# Patient Record
Sex: FEMALE | Race: BLACK OR AFRICAN AMERICAN | NOT HISPANIC OR LATINO
[De-identification: names, ages, dates, MRNs, and addresses within clinical notes are randomized per-mention and may not be internally consistent; named-entity substitution may affect disease eponyms.]

---

## 2017-11-30 ENCOUNTER — RESULT REVIEW (OUTPATIENT)
Age: 47
End: 2017-11-30

## 2019-11-14 ENCOUNTER — RESULT REVIEW (OUTPATIENT)
Age: 49
End: 2019-11-14

## 2020-05-05 ENCOUNTER — APPOINTMENT (OUTPATIENT)
Dept: ORTHOPEDIC SURGERY | Facility: CLINIC | Age: 50
End: 2020-05-05
Payer: COMMERCIAL

## 2020-05-05 VITALS
SYSTOLIC BLOOD PRESSURE: 116 MMHG | BODY MASS INDEX: 42.75 KG/M2 | DIASTOLIC BLOOD PRESSURE: 71 MMHG | WEIGHT: 266 LBS | HEART RATE: 73 BPM | HEIGHT: 66 IN

## 2020-05-05 DIAGNOSIS — Z85.3 PERSONAL HISTORY OF MALIGNANT NEOPLASM OF BREAST: ICD-10-CM

## 2020-05-05 DIAGNOSIS — C79.51 SECONDARY MALIGNANT NEOPLASM OF BONE: ICD-10-CM

## 2020-05-05 DIAGNOSIS — C79.51 MALIGNANT NEOPLASM OF UNSPECIFIED SITE OF LEFT FEMALE BREAST: ICD-10-CM

## 2020-05-05 DIAGNOSIS — Z78.9 OTHER SPECIFIED HEALTH STATUS: ICD-10-CM

## 2020-05-05 DIAGNOSIS — Z86.69 PERSONAL HISTORY OF OTHER DISEASES OF THE NERVOUS SYSTEM AND SENSE ORGANS: ICD-10-CM

## 2020-05-05 DIAGNOSIS — C50.912 MALIGNANT NEOPLASM OF UNSPECIFIED SITE OF LEFT FEMALE BREAST: ICD-10-CM

## 2020-05-05 PROCEDURE — 72190 X-RAY EXAM OF PELVIS: CPT

## 2020-05-05 PROCEDURE — 99203 OFFICE O/P NEW LOW 30 MIN: CPT

## 2020-05-05 RX ORDER — FUROSEMIDE 80 MG/1
TABLET ORAL
Refills: 0 | Status: ACTIVE | COMMUNITY

## 2020-05-05 RX ORDER — DENOSUMAB 120 MG/1.7ML
120 INJECTION SUBCUTANEOUS
Refills: 0 | Status: ACTIVE | COMMUNITY

## 2020-05-05 NOTE — DISCUSSION/SUMMARY
[de-identified] : Patient has lesions that are hot on PET scan but there is no obvious impending pathologic fracture.  The cortical bone itself looks okay all around and she is having no pain with weightbearing and walking.  My recommendations are to continue with her chemotherapy and to continue following this.  Should these become symptomatic I can certainly see her sooner but otherwise I can follow her up with exam after the next PET/CT or as needed.  She currently does not need any orthopedic intervention.  She is already on bone sparing medications.\par \par \par If imaging was ordered, the patient was told to make an appointment to review findings right after all imaging is completed.\par \par We discussed risks, benefits and alternatives. Rationale of care was reviewed and all questions were answered. Patient (and family) had all questions answered to her degree of the level of satisfaction. Patient (and family) expressed understanding and interest in proceeding with the plan as outlined.\par \par \par \par \par This note was done with a voice recognition transcription software and any typos are related to this rather than medical error. Surgical risks reviewed. Patient (and family) had all questions answered to an agreeable level of satisfaction. Patient (and family) expressed understanding and interest in proceeding with the plan as outlined.  \par

## 2020-05-05 NOTE — HISTORY OF PRESENT ILLNESS
[FreeTextEntry1] : This is a 49-year-old female who was diagnosed with ER positive WA negative HER-2 positive breast cancer in 2010 having had lumpectomy and lymph node dissection.  She had been doing well for a few years on and off with chemotherapy and other findings until recently on a PET scan she was found to have significant bony disease.  She had known about a spine pathologic fracture in the past which never had any other treatment.  She currently had been having some bilateral left worse than right hip pain which was handled with ibuprofen or acetaminophen but was always able to walk.  She started chemotherapy as month and a half ago and her pain has completely resolved.  She is walking without problems.  She uses a cane because of a previous ankle fracture around 2014.  She has been on denosumab since 2017. [Bending] : not exacerbated by bending [Direct Pressure] : not exacerbated by direct pressure

## 2020-05-05 NOTE — PHYSICAL EXAM
[FreeTextEntry1] : On exam patient is walking almost normally.  She has good flexion up to 90 degrees for self.  She has no pain with abduction internal and external rotation as well as with lateral and AP compression of the pelvis.  She has no pain over the sacrum or over the ischium bilaterally.  Her leg lengths are equal.  She is neurovascularly intact with no paresthesias.  She has no lymphadenopathy.  She has no upper extremity pain. [Swelling] : no swelling [Skin Changes - Describe changes:] : No skin changes noted [Tenderness] : no tenderness

## 2020-05-05 NOTE — CONSULT LETTER
[FreeTextEntry2] : Yani Crum MD\par [id number]\par [place] [id number] [FreeTextEntry1] : \par After evaluating your patient and reviewing the studies presented we have come to a mutually agreeable plan. \par \par Please see my note below.\par \par Should you have further questions, please feel free to call and discuss the care of your patient.\par \par Thank you for the confidence of your referral.\par \par Sincerely, \par \par Joseph Garza MD \par Musculoskeletal Oncology \par [address] [facility], [address], \[place] [address] \par [phone]

## 2020-05-05 NOTE — DATA REVIEWED
[de-identified] : X-rays today AP and Judet views of the pelvis and bilateral hips show no obvious destructive lesions.  There may be some faint sclerosis in the left femoral neck.  There are no areas of lucencies of concern from the PET/CT.\par \par PET/CT from February 28, 2020 skull base to thigh shows multiple metastatic osseous lesions including the right side of the sacrum L1 left femoral neck lesion with no pathologic fracture compression fracture involving T9 which is old as well as in the right ischium.

## 2020-05-05 NOTE — REASON FOR VISIT
[FreeTextEntry1] : Findings on PET/CT of bony metastasis with a history of breast cancer, sent in consultation by Dr. Crum

## 2020-06-01 ENCOUNTER — APPOINTMENT (OUTPATIENT)
Dept: DISASTER EMERGENCY | Facility: CLINIC | Age: 50
End: 2020-06-01

## 2020-06-01 DIAGNOSIS — Z01.818 ENCOUNTER FOR OTHER PREPROCEDURAL EXAMINATION: ICD-10-CM

## 2020-06-02 LAB — SARS-COV-2 N GENE NPH QL NAA+PROBE: NOT DETECTED

## 2025-01-16 ENCOUNTER — EMERGENCY (EMERGENCY)
Facility: HOSPITAL | Age: 55
LOS: 0 days | Discharge: ANOTHER HOSPITAL | End: 2025-01-17
Attending: STUDENT IN AN ORGANIZED HEALTH CARE EDUCATION/TRAINING PROGRAM
Payer: COMMERCIAL

## 2025-01-16 VITALS
TEMPERATURE: 98 F | HEIGHT: 66 IN | SYSTOLIC BLOOD PRESSURE: 125 MMHG | DIASTOLIC BLOOD PRESSURE: 80 MMHG | RESPIRATION RATE: 18 BRPM | OXYGEN SATURATION: 98 % | HEART RATE: 64 BPM | WEIGHT: 266.1 LBS

## 2025-01-16 LAB
ANION GAP SERPL CALC-SCNC: 7 MMOL/L — SIGNIFICANT CHANGE UP (ref 5–17)
BUN SERPL-MCNC: 11 MG/DL — SIGNIFICANT CHANGE UP (ref 7–23)
CALCIUM SERPL-MCNC: 9.5 MG/DL — SIGNIFICANT CHANGE UP (ref 8.5–10.1)
CHLORIDE SERPL-SCNC: 107 MMOL/L — SIGNIFICANT CHANGE UP (ref 96–108)
CO2 SERPL-SCNC: 27 MMOL/L — SIGNIFICANT CHANGE UP (ref 22–31)
CREAT SERPL-MCNC: 0.89 MG/DL — SIGNIFICANT CHANGE UP (ref 0.5–1.3)
EGFR: 78 ML/MIN/1.73M2 — SIGNIFICANT CHANGE UP
GLUCOSE SERPL-MCNC: 83 MG/DL — SIGNIFICANT CHANGE UP (ref 70–99)
HCG SERPL-ACNC: <1 MIU/ML — SIGNIFICANT CHANGE UP
HCT VFR BLD CALC: 28 % — LOW (ref 34.5–45)
HGB BLD-MCNC: 9.1 G/DL — LOW (ref 11.5–15.5)
MCHC RBC-ENTMCNC: 32.5 G/DL — SIGNIFICANT CHANGE UP (ref 32–36)
MCHC RBC-ENTMCNC: 32.6 PG — SIGNIFICANT CHANGE UP (ref 27–34)
MCV RBC AUTO: 100.4 FL — HIGH (ref 80–100)
NRBC # BLD: 3 /100 WBCS — HIGH (ref 0–0)
PLATELET # BLD AUTO: 252 K/UL — SIGNIFICANT CHANGE UP (ref 150–400)
POTASSIUM SERPL-MCNC: 4.1 MMOL/L — SIGNIFICANT CHANGE UP (ref 3.5–5.3)
POTASSIUM SERPL-SCNC: 4.1 MMOL/L — SIGNIFICANT CHANGE UP (ref 3.5–5.3)
RBC # BLD: 2.79 M/UL — LOW (ref 3.8–5.2)
RBC # FLD: 22.9 % — HIGH (ref 10.3–14.5)
SODIUM SERPL-SCNC: 141 MMOL/L — SIGNIFICANT CHANGE UP (ref 135–145)
WBC # BLD: 4.66 K/UL — SIGNIFICANT CHANGE UP (ref 3.8–10.5)
WBC # FLD AUTO: 4.66 K/UL — SIGNIFICANT CHANGE UP (ref 3.8–10.5)

## 2025-01-16 PROCEDURE — 99285 EMERGENCY DEPT VISIT HI MDM: CPT

## 2025-01-16 NOTE — ED PROVIDER NOTE - ATTENDING CONTRIBUTION TO CARE
This patient was evaluated with linda Landa.  The patient's HPI, ROS, and physical exam above were noted and agreed to unless otherwise commented on below.  Nursing notes and vital signs were reviewed.     HPI: This patient is a 54-year-old female presenting to the emergency department today for back pain.  She is a past medical history of metastatic breast cancer.  She has had back pain for roughly 3 days.  This patient states that she has had back pain for roughly 3 days.  She was seen 2 days ago and had a CT scan performed that was read as negative.  She is complaining of some numbness in her legs.  States that she has some urinary retention as well as fecal retention.  No nausea or vomiting.  No headaches, lightheadedness, or dizziness.  No chest pain or shortness of breath.  She denies fevers.  States that she has no redness over the back.  She has no other complaints at this time.    Pertinent Physical Exam:   GENERAL: The patient appears well and is in no apparent distress.    EYES: Pupils equal and reactive.  Extraocular eye movements are intact.    ENT: Head is atraumatic.  Posterior oropharynx is unremarkable.      RESPIRATORY: Lungs are clear to auscultation bilaterally.  The patient has no significant wheezing, rhonchi, or rales.    CARDIOVASCULAR: The patient has a regular rate and rhythm with no significant murmurs, rubs, or gallops.    ABDOMEN: Abdomen is soft, nondistended, and non-peritoneal.  The patient has no focal areas of tenderness.    SKIN: Skin is intact without evidence of significant lacerations or sores.    MUSCULOSKELETAL: Patient has good range of motion of all extremities.  The patient has good distal cap refill.  The patient has palpable distal pulses.  No obvious edema is noted.  Tender to palpation over the lumbar region of the spine.    NEUROLOGIC: Cranial nerves II through XII are grossly within normal limits.  Sensory and motor examination is unremarkable.  Positive for saddle anesthesia    PSYCHIATRIC: Patient is awake, alert, and oriented ×4.    MDM: This patient was evaluated with linda Landa.  The patient's HPI, ROS, and physical exam above were noted and agreed to unless otherwise commented on below.  Nursing notes and vital signs were reviewed.     HPI: This patient is a 54-year-old female presenting to the emergency department today for back pain.  She is a past medical history of metastatic breast cancer.  She has had back pain for roughly 3 days.  This patient states that she has had back pain for roughly 3 days.  She was seen 2 days ago and had a CT scan performed that was read as negative.  She is complaining of some numbness in her legs.  States that she has some urinary retention as well as fecal retention.  No nausea or vomiting.  No headaches, lightheadedness, or dizziness.  No chest pain or shortness of breath.  She denies fevers.  States that she has no redness over the back.  She has no other complaints at this time.    Pertinent Physical Exam:   GENERAL: The patient appears well and is in no apparent distress.    EYES: Pupils equal and reactive.  Extraocular eye movements are intact.    ENT: Head is atraumatic.  Posterior oropharynx is unremarkable.      RESPIRATORY: Lungs are clear to auscultation bilaterally.  The patient has no significant wheezing, rhonchi, or rales.    CARDIOVASCULAR: The patient has a regular rate and rhythm with no significant murmurs, rubs, or gallops.    ABDOMEN: Abdomen is soft, nondistended, and non-peritoneal.  The patient has no focal areas of tenderness.    SKIN: Skin is intact without evidence of significant lacerations or sores.    MUSCULOSKELETAL: Patient has good range of motion of all extremities.  The patient has good distal cap refill.  The patient has palpable distal pulses.  No obvious edema is noted.  Tender to palpation over the lumbar region of the spine.    NEUROLOGIC: Cranial nerves II through XII are grossly within normal limits.  Sensory and motor examination is unremarkable.  Positive for saddle anesthesia    PSYCHIATRIC: Patient is awake, alert, and oriented ×4.    MDM:  This patient is a 54-year-old female presenting to the emergency department today for back pain.  CBC shows no leukocytosis.  No acute anemia.  No thrombocytopenia.  BMP shows no significant electrode abnormalities.  Patient's pregnancy is negative.    Patient is complaining of saddle anesthesia.  She has urinary retention.  She has gait ataxia as well.  History of metastatic breast cancer.  Concern today for possible cauda equina syndrome.  An MRI of the lumbar spine will be ordered for this patient.    I discussed this case with the MRI technician who states that he will arrive to perform MRI for this patient.  This patient's MRI will not be completed until after the end of my shift.  For this reason, the patient will be signed out to the oncoming physician.  Patient's urinalysis is also not been completed at this time.  Please see the oncoming physician's addendum's, notes, and progress notes for any change in this patient's management or care. This patient was evaluated with linda Landa.  The patient's HPI, ROS, and physical exam above were noted and agreed to unless otherwise commented on below.  Nursing notes and vital signs were reviewed.     HPI: This patient is a 54-year-old female presenting to the emergency department today for back pain.  She is a past medical history of metastatic breast cancer.  She has had back pain for roughly 3 days.  This patient states that she has had back pain for roughly 3 days.  She was seen 2 days ago and had a CT scan performed that was read as negative.  She is complaining of some numbness in her legs.  States that she has some urinary retention as well as fecal retention.  No nausea or vomiting.  No headaches, lightheadedness, or dizziness.  No chest pain or shortness of breath.  She denies fevers.  States that she has no redness over the back.  She has no other complaints at this time.    Pertinent Physical Exam:   GENERAL: The patient appears well and is in no apparent distress.    EYES: Pupils equal and reactive.  Extraocular eye movements are intact.    ENT: Head is atraumatic.  Posterior oropharynx is unremarkable.      RESPIRATORY: Lungs are clear to auscultation bilaterally.  The patient has no significant wheezing, rhonchi, or rales.    CARDIOVASCULAR: The patient has a regular rate and rhythm with no significant murmurs, rubs, or gallops.    ABDOMEN: Abdomen is soft, nondistended, and non-peritoneal.  The patient has no focal areas of tenderness.    SKIN: Skin is intact without evidence of significant lacerations or sores.    MUSCULOSKELETAL: Patient has good range of motion of all extremities.  The patient has good distal cap refill.  The patient has palpable distal pulses.  No obvious edema is noted.  Tender to palpation over the lumbar region of the spine.    NEUROLOGIC: Cranial nerves II through XII are grossly within normal limits.  Sensory and motor examination is unremarkable.  Positive for saddle anesthesia    PSYCHIATRIC: Patient is awake, alert, and oriented ×4.    MDM:  This patient is a 54-year-old female presenting to the emergency department today for back pain.  CBC shows no leukocytosis.  No acute anemia.  No thrombocytopenia.  BMP shows no significant electrode abnormalities.  Patient's pregnancy is negative.    Patient is complaining of saddle anesthesia.  She has urinary retention.  She has gait ataxia as well.  History of metastatic breast cancer.  Concern today for possible cauda equina syndrome.  An MRI of the lumbar spine will be ordered for this patient.    I discussed this case with the MRI technician who states that he will arrive to perform MRI for this patient.  This patient's MRI will not be completed until after the end of my shift.  For this reason, the patient will be signed out to the oncoming physician.  Patient's urinalysis is also not been completed at this time.  Please see the oncoming physician's addendum's, notes, and progress notes for any change in this patient's management or care.    30 minutes of critical care time were spent on patient evaluation, medical decision making, interpretation of results, discussion with consultants and documentation.

## 2025-01-16 NOTE — ED ADULT NURSE NOTE - OBJECTIVE STATEMENT
Patient alert and oriented x4. Patient is a 54 year old female w/ PMH breast cancer c/o back pain. States lower back pain began on Monday and that she began feeling numbness and tingling down b/l legs so she saw her doctor and she got an outpatient CT scan. CT scan was normal so she came into ER as problem has not resolved. Also c/o bilateral lower extremity swelling as well as difficulty urinating. States she is unable to ambulate. Currently undergoing chemotherapy, last session on 1/2.

## 2025-01-16 NOTE — ED PROVIDER NOTE - NSICDXPASTMEDICALHX_GEN_ALL_CORE_FT
PAST MEDICAL HISTORY:  Ankle fracture, right     Breast CA, left     No pertinent past medical history

## 2025-01-16 NOTE — ED ADULT TRIAGE NOTE - CHIEF COMPLAINT QUOTE
c/o lower back pain since monday with b/l lower extremities swollen and constipation difficulty urinating states numbness to b/l lower extremities states l lower back pain and under treatment with chemo for metastatic breast ca mets to bone had ct abd/pelvis on tues no abnormal findings l lumpectomy with lymph nodes removed 2010 at baseline ambulatory with cane prn , extremely painful to ambulate/bear weight c/o abdominal bloating and mild nausea, back spasms denies recent injury or known trauma

## 2025-01-16 NOTE — ED ADULT NURSE NOTE - NSFALLRISKINTERV_ED_ALL_ED

## 2025-01-16 NOTE — ED PROVIDER NOTE - PROGRESS NOTE DETAILS
I called the [facility] and spoke to Dr. Judd of neurosurgery and discussed the case with her and she and her attending Dr. Green accepted the transfer to the [facility] ED, tier 1 and agree with dexamethasone in the ED. - Boby ROB

## 2025-01-16 NOTE — ED PROVIDER NOTE - OBJECTIVE STATEMENT
54y F w/ PMHx metastatic breast CA and heart disease presenting with back pain x 3 days. Pt states Monday night she started to feel B/L lower back pain associated with B/L lower ext numbness causing her difficulty ambulating, urinary retention, and constipation. States she was waiting on CT results which came back negative today, causing her to come to the ED d/t confusion of her symptoms. Currently undergoing chemo every 3 weeks (last chemo Jan 2nd 2024). Pt denies fever, cough, chest pain, SOB, or recent falls.

## 2025-01-16 NOTE — ED PROVIDER NOTE - INTERNATIONAL TRAVEL
.  Visit Information    Have you changed or started any medications since your last visit including any over-the-counter medicines, vitamins, or herbal medicines? no   Are you having any side effects from any of your medications? -  no  Have you stopped taking any of your medications? Is so, why? -  no    Have you seen any other physician or provider since your last visit? No  Have you had any other diagnostic tests since your last visit? No  Have you been seen in the emergency room and/or had an admission to a hospital since we last saw you? No  Have you had your routine dental cleaning in the past 6 months? yes -    Have you activated your [employer] account? If not, what are your barriers?  Yes     Patient Care Team:  Sapphire Thao MD as PCP - General (Family Medicine)  Sapphire Thao MD as PCP - [facility]  Jeb Quan MD (Dermatology)  Julisa Hanley MD as Surgeon (Cardiology)  Meño Quach DPM as Consulting Physician (Podiatry)  Wesley Munguia MD as Resident (Family Medicine)  Irene Primes    Medical History Review  Past Medical, Family, and Social History reviewed and does contribute to the patient presenting condition    Health Maintenance   Topic Date Due    Colon cancer screen colonoscopy  Never done    Flu vaccine (1) 09/01/2021    Lipid screen  03/17/2022    Potassium monitoring  03/17/2022    Creatinine monitoring  03/17/2022    DTaP/Tdap/Td vaccine (2 - Td or Tdap) 01/01/2024    Diabetes screen  03/12/2024    COVID-19 Vaccine  Completed    Hepatitis C screen  Completed    HIV screen  Completed    Hepatitis A vaccine  Aged Out    Hepatitis B vaccine  Aged Out    Hib vaccine  Aged Out    Meningococcal (ACWY) vaccine  Aged Out    Pneumococcal 0-64 years Vaccine  Aged Out No

## 2025-01-16 NOTE — ED PROVIDER NOTE - CARE PLAN
1 Principal Discharge DX:	Back pain   Principal Discharge DX:	Compression of thoracic or lumbar spinal cord

## 2025-01-17 ENCOUNTER — INPATIENT (INPATIENT)
Facility: HOSPITAL | Age: 55
LOS: 5 days | Discharge: HOME HEALTH | DRG: 29 | End: 2025-01-23
Attending: NEUROLOGICAL SURGERY | Admitting: NEUROLOGICAL SURGERY
Payer: COMMERCIAL

## 2025-01-17 ENCOUNTER — RESULT REVIEW (OUTPATIENT)
Age: 55
End: 2025-01-17

## 2025-01-17 VITALS
DIASTOLIC BLOOD PRESSURE: 87 MMHG | OXYGEN SATURATION: 99 % | SYSTOLIC BLOOD PRESSURE: 124 MMHG | TEMPERATURE: 98 F | HEART RATE: 80 BPM | RESPIRATION RATE: 18 BRPM

## 2025-01-17 VITALS
OXYGEN SATURATION: 99 % | DIASTOLIC BLOOD PRESSURE: 60 MMHG | HEART RATE: 64 BPM | TEMPERATURE: 98 F | SYSTOLIC BLOOD PRESSURE: 142 MMHG | RESPIRATION RATE: 12 BRPM

## 2025-01-17 DIAGNOSIS — C79.81 SECONDARY MALIGNANT NEOPLASM OF BREAST: ICD-10-CM

## 2025-01-17 DIAGNOSIS — C80.1 MALIGNANT (PRIMARY) NEOPLASM, UNSPECIFIED: ICD-10-CM

## 2025-01-17 DIAGNOSIS — R20.0 ANESTHESIA OF SKIN: ICD-10-CM

## 2025-01-17 DIAGNOSIS — M54.50 LOW BACK PAIN, UNSPECIFIED: ICD-10-CM

## 2025-01-17 DIAGNOSIS — G95.20 UNSPECIFIED CORD COMPRESSION: ICD-10-CM

## 2025-01-17 DIAGNOSIS — R33.9 RETENTION OF URINE, UNSPECIFIED: ICD-10-CM

## 2025-01-17 LAB
ALBUMIN SERPL ELPH-MCNC: 4 G/DL — SIGNIFICANT CHANGE UP (ref 3.3–5)
ALP SERPL-CCNC: 69 U/L — SIGNIFICANT CHANGE UP (ref 40–120)
ALT FLD-CCNC: 10 U/L — SIGNIFICANT CHANGE UP (ref 10–45)
ANION GAP SERPL CALC-SCNC: 15 MMOL/L — SIGNIFICANT CHANGE UP (ref 5–17)
ANISOCYTOSIS BLD QL: SIGNIFICANT CHANGE UP
APTT BLD: 36.1 SEC — HIGH (ref 24.5–35.6)
AST SERPL-CCNC: 17 U/L — SIGNIFICANT CHANGE UP (ref 10–40)
BASOPHILS # BLD AUTO: 0 K/UL — SIGNIFICANT CHANGE UP (ref 0–0.2)
BASOPHILS NFR BLD AUTO: 0 % — SIGNIFICANT CHANGE UP (ref 0–2)
BILIRUB SERPL-MCNC: 0.7 MG/DL — SIGNIFICANT CHANGE UP (ref 0.2–1.2)
BLD GP AB SCN SERPL QL: NEGATIVE — SIGNIFICANT CHANGE UP
BUN SERPL-MCNC: 11 MG/DL — SIGNIFICANT CHANGE UP (ref 7–23)
CALCIUM SERPL-MCNC: 9.6 MG/DL — SIGNIFICANT CHANGE UP (ref 8.4–10.5)
CHLORIDE SERPL-SCNC: 103 MMOL/L — SIGNIFICANT CHANGE UP (ref 96–108)
CO2 SERPL-SCNC: 22 MMOL/L — SIGNIFICANT CHANGE UP (ref 22–31)
CREAT SERPL-MCNC: 0.93 MG/DL — SIGNIFICANT CHANGE UP (ref 0.5–1.3)
EGFR: 74 ML/MIN/1.73M2 — SIGNIFICANT CHANGE UP
EOSINOPHIL # BLD AUTO: 0.05 K/UL — SIGNIFICANT CHANGE UP (ref 0–0.5)
EOSINOPHIL NFR BLD AUTO: 0.9 % — SIGNIFICANT CHANGE UP (ref 0–6)
GAS PNL BLDV: SIGNIFICANT CHANGE UP
GLUCOSE SERPL-MCNC: 107 MG/DL — HIGH (ref 70–99)
HCG SERPL-ACNC: <2 MIU/ML — SIGNIFICANT CHANGE UP
HCG SERPL-ACNC: <2 MIU/ML — SIGNIFICANT CHANGE UP
HCT VFR BLD CALC: 27.8 % — LOW (ref 34.5–45)
HGB BLD-MCNC: 8.7 G/DL — LOW (ref 11.5–15.5)
INR BLD: 1.09 RATIO — SIGNIFICANT CHANGE UP (ref 0.85–1.16)
LYMPHOCYTES # BLD AUTO: 0.71 K/UL — LOW (ref 1–3.3)
LYMPHOCYTES # BLD AUTO: 13 % — SIGNIFICANT CHANGE UP (ref 13–44)
MANUAL SMEAR VERIFICATION: SIGNIFICANT CHANGE UP
MCHC RBC-ENTMCNC: 31.3 G/DL — LOW (ref 32–36)
MCHC RBC-ENTMCNC: 31.6 PG — SIGNIFICANT CHANGE UP (ref 27–34)
MCV RBC AUTO: 101.1 FL — HIGH (ref 80–100)
METAMYELOCYTES # FLD: 1.7 % — HIGH (ref 0–0)
METAMYELOCYTES NFR BLD: 1.7 % — HIGH (ref 0–0)
MONOCYTES # BLD AUTO: 0.38 K/UL — SIGNIFICANT CHANGE UP (ref 0–0.9)
MONOCYTES NFR BLD AUTO: 7 % — SIGNIFICANT CHANGE UP (ref 2–14)
MYELOCYTES NFR BLD: 0.9 % — HIGH (ref 0–0)
NEUTROPHILS # BLD AUTO: 4.15 K/UL — SIGNIFICANT CHANGE UP (ref 1.8–7.4)
NEUTROPHILS NFR BLD AUTO: 74.8 % — SIGNIFICANT CHANGE UP (ref 43–77)
NEUTS BAND # BLD: 1.7 % — SIGNIFICANT CHANGE UP (ref 0–8)
NEUTS BAND NFR BLD: 1.7 % — SIGNIFICANT CHANGE UP (ref 0–8)
PLAT MORPH BLD: NORMAL — SIGNIFICANT CHANGE UP
PLATELET # BLD AUTO: 260 K/UL — SIGNIFICANT CHANGE UP (ref 150–400)
POIKILOCYTOSIS BLD QL AUTO: SLIGHT — SIGNIFICANT CHANGE UP
POLYCHROMASIA BLD QL SMEAR: SLIGHT — SIGNIFICANT CHANGE UP
POTASSIUM SERPL-MCNC: 3.6 MMOL/L — SIGNIFICANT CHANGE UP (ref 3.5–5.3)
POTASSIUM SERPL-SCNC: 3.6 MMOL/L — SIGNIFICANT CHANGE UP (ref 3.5–5.3)
PROT SERPL-MCNC: 7.2 G/DL — SIGNIFICANT CHANGE UP (ref 6–8.3)
PROTHROM AB SERPL-ACNC: 12.4 SEC — SIGNIFICANT CHANGE UP (ref 9.9–13.4)
RBC # BLD: 2.75 M/UL — LOW (ref 3.8–5.2)
RBC # FLD: 23 % — HIGH (ref 10.3–14.5)
RBC BLD AUTO: ABNORMAL
RH IG SCN BLD-IMP: POSITIVE — SIGNIFICANT CHANGE UP
RH IG SCN BLD-IMP: POSITIVE — SIGNIFICANT CHANGE UP
SODIUM SERPL-SCNC: 140 MMOL/L — SIGNIFICANT CHANGE UP (ref 135–145)
WBC # BLD: 5.43 K/UL — SIGNIFICANT CHANGE UP (ref 3.8–10.5)
WBC # FLD AUTO: 5.43 K/UL — SIGNIFICANT CHANGE UP (ref 3.8–10.5)

## 2025-01-17 PROCEDURE — 72149 MRI LUMBAR SPINE W/DYE: CPT | Mod: 26

## 2025-01-17 PROCEDURE — 99291 CRITICAL CARE FIRST HOUR: CPT

## 2025-01-17 PROCEDURE — 74177 CT ABD & PELVIS W/CONTRAST: CPT | Mod: 26

## 2025-01-17 PROCEDURE — 93970 EXTREMITY STUDY: CPT | Mod: 26

## 2025-01-17 PROCEDURE — 93306 TTE W/DOPPLER COMPLETE: CPT | Mod: 26

## 2025-01-17 PROCEDURE — 71260 CT THORAX DX C+: CPT | Mod: 26

## 2025-01-17 PROCEDURE — 72147 MRI CHEST SPINE W/DYE: CPT | Mod: 26

## 2025-01-17 RX ORDER — ANTISEPTIC SURGICAL SCRUB 0.04 MG/ML
1 SOLUTION TOPICAL DAILY
Refills: 0 | Status: DISCONTINUED | OUTPATIENT
Start: 2025-01-17 | End: 2025-01-18

## 2025-01-17 RX ORDER — POTASSIUM CHLORIDE 750 MG/1
20 TABLET, EXTENDED RELEASE ORAL
Refills: 0 | Status: COMPLETED | OUTPATIENT
Start: 2025-01-17 | End: 2025-01-17

## 2025-01-17 RX ORDER — DEXAMETHASONE SODIUM PHOSPHATE 4 MG/ML
10 VIAL (ML) INJECTION ONCE
Refills: 0 | Status: COMPLETED | OUTPATIENT
Start: 2025-01-17 | End: 2025-01-17

## 2025-01-17 RX ORDER — BISACODYL 5 MG
5 TABLET, DELAYED RELEASE (ENTERIC COATED) ORAL DAILY
Refills: 0 | Status: DISCONTINUED | OUTPATIENT
Start: 2025-01-17 | End: 2025-01-18

## 2025-01-17 RX ORDER — POLYETHYLENE GLYCOL 3350 17 G/17G
17 POWDER, FOR SOLUTION ORAL DAILY
Refills: 0 | Status: DISCONTINUED | OUTPATIENT
Start: 2025-01-17 | End: 2025-01-18

## 2025-01-17 RX ORDER — OXYCODONE HYDROCHLORIDE 30 MG/1
5 TABLET ORAL EVERY 4 HOURS
Refills: 0 | Status: DISCONTINUED | OUTPATIENT
Start: 2025-01-17 | End: 2025-01-18

## 2025-01-17 RX ORDER — DEXAMETHASONE SODIUM PHOSPHATE 4 MG/ML
4 INJECTION, SOLUTION INTRA-ARTICULAR; INTRALESIONAL; INTRAMUSCULAR; INTRAVENOUS; SOFT TISSUE EVERY 6 HOURS
Refills: 0 | Status: DISCONTINUED | OUTPATIENT
Start: 2025-01-17 | End: 2025-01-18

## 2025-01-17 RX ORDER — BACTERIOSTATIC SODIUM CHLORIDE 0.9 %
500 VIAL (ML) INJECTION ONCE
Refills: 0 | Status: COMPLETED | OUTPATIENT
Start: 2025-01-17 | End: 2025-01-17

## 2025-01-17 RX ORDER — METOPROLOL SUCCINATE 25 MG
1 TABLET, EXTENDED RELEASE 24 HR ORAL
Refills: 0 | DISCHARGE

## 2025-01-17 RX ORDER — METOPROLOL SUCCINATE 25 MG
25 TABLET, EXTENDED RELEASE 24 HR ORAL DAILY
Refills: 0 | Status: DISCONTINUED | OUTPATIENT
Start: 2025-01-17 | End: 2025-01-17

## 2025-01-17 RX ORDER — METOPROLOL SUCCINATE 25 MG
12.5 TABLET, EXTENDED RELEASE 24 HR ORAL
Refills: 0 | Status: DISCONTINUED | OUTPATIENT
Start: 2025-01-17 | End: 2025-01-18

## 2025-01-17 RX ORDER — PANTOPRAZOLE 20 MG/1
40 TABLET, DELAYED RELEASE ORAL DAILY
Refills: 0 | Status: DISCONTINUED | OUTPATIENT
Start: 2025-01-17 | End: 2025-01-17

## 2025-01-17 RX ORDER — FAMOTIDINE 10 MG/ML
1 INJECTION INTRAVENOUS
Refills: 0 | DISCHARGE

## 2025-01-17 RX ORDER — PHENYLEPHRINE HYDROCHLORIDE 10 MG/ML
0.1 INJECTION INTRAVENOUS
Qty: 40 | Refills: 0 | Status: DISCONTINUED | OUTPATIENT
Start: 2025-01-17 | End: 2025-01-18

## 2025-01-17 RX ORDER — BACTERIOSTATIC SODIUM CHLORIDE 0.9 %
1000 VIAL (ML) INJECTION
Refills: 0 | Status: DISCONTINUED | OUTPATIENT
Start: 2025-01-17 | End: 2025-01-18

## 2025-01-17 RX ORDER — SENNOSIDES 8.6 MG
2 TABLET ORAL AT BEDTIME
Refills: 0 | Status: DISCONTINUED | OUTPATIENT
Start: 2025-01-17 | End: 2025-01-18

## 2025-01-17 RX ORDER — FAMOTIDINE 10 MG/ML
40 INJECTION INTRAVENOUS
Refills: 0 | Status: DISCONTINUED | OUTPATIENT
Start: 2025-01-17 | End: 2025-01-18

## 2025-01-17 RX ORDER — OXYCODONE HYDROCHLORIDE 30 MG/1
10 TABLET ORAL EVERY 4 HOURS
Refills: 0 | Status: DISCONTINUED | OUTPATIENT
Start: 2025-01-17 | End: 2025-01-18

## 2025-01-17 RX ORDER — BACTERIOSTATIC SODIUM CHLORIDE 0.9 %
1000 VIAL (ML) INJECTION ONCE
Refills: 0 | Status: COMPLETED | OUTPATIENT
Start: 2025-01-17 | End: 2025-01-17

## 2025-01-17 RX ORDER — OMEPRAZOLE 20 MG/1
1 CAPSULE, DELAYED RELEASE ORAL
Refills: 0 | DISCHARGE

## 2025-01-17 RX ORDER — ACETAMINOPHEN 160 MG/5ML
1000 SUSPENSION ORAL EVERY 6 HOURS
Refills: 0 | Status: DISCONTINUED | OUTPATIENT
Start: 2025-01-17 | End: 2025-01-18

## 2025-01-17 RX ADMIN — Medication 75 MILLILITER(S): at 05:45

## 2025-01-17 RX ADMIN — Medication 75 MILLILITER(S): at 03:25

## 2025-01-17 RX ADMIN — Medication 1000 MILLILITER(S): at 17:06

## 2025-01-17 RX ADMIN — PHENYLEPHRINE HYDROCHLORIDE 4.46 MICROGRAM(S)/KG/MIN: 10 INJECTION INTRAVENOUS at 20:13

## 2025-01-17 RX ADMIN — DEXAMETHASONE SODIUM PHOSPHATE 4 MILLIGRAM(S): 4 INJECTION, SOLUTION INTRA-ARTICULAR; INTRALESIONAL; INTRAMUSCULAR; INTRAVENOUS; SOFT TISSUE at 09:16

## 2025-01-17 RX ADMIN — Medication 5 MILLIGRAM(S): at 12:17

## 2025-01-17 RX ADMIN — DEXAMETHASONE SODIUM PHOSPHATE 4 MILLIGRAM(S): 4 INJECTION, SOLUTION INTRA-ARTICULAR; INTRALESIONAL; INTRAMUSCULAR; INTRAVENOUS; SOFT TISSUE at 17:06

## 2025-01-17 RX ADMIN — POTASSIUM CHLORIDE 20 MILLIEQUIVALENT(S): 750 TABLET, EXTENDED RELEASE ORAL at 09:16

## 2025-01-17 RX ADMIN — Medication 1000 MILLILITER(S): at 09:20

## 2025-01-17 RX ADMIN — Medication 100 MILLILITER(S): at 09:21

## 2025-01-17 RX ADMIN — DEXAMETHASONE SODIUM PHOSPHATE 4 MILLIGRAM(S): 4 INJECTION, SOLUTION INTRA-ARTICULAR; INTRALESIONAL; INTRAMUSCULAR; INTRAVENOUS; SOFT TISSUE at 12:18

## 2025-01-17 RX ADMIN — DEXAMETHASONE SODIUM PHOSPHATE 4 MILLIGRAM(S): 4 INJECTION, SOLUTION INTRA-ARTICULAR; INTRALESIONAL; INTRAMUSCULAR; INTRAVENOUS; SOFT TISSUE at 23:46

## 2025-01-17 RX ADMIN — ACETAMINOPHEN 400 MILLIGRAM(S): 160 SUSPENSION ORAL at 11:02

## 2025-01-17 RX ADMIN — Medication 102 MILLIGRAM(S): at 01:15

## 2025-01-17 RX ADMIN — ACETAMINOPHEN 1000 MILLIGRAM(S): 160 SUSPENSION ORAL at 11:32

## 2025-01-17 RX ADMIN — POTASSIUM CHLORIDE 20 MILLIEQUIVALENT(S): 750 TABLET, EXTENDED RELEASE ORAL at 12:17

## 2025-01-17 RX ADMIN — POTASSIUM CHLORIDE 20 MILLIEQUIVALENT(S): 750 TABLET, EXTENDED RELEASE ORAL at 13:29

## 2025-01-17 RX ADMIN — Medication 2 TABLET(S): at 21:55

## 2025-01-17 RX ADMIN — Medication 100 MILLILITER(S): at 19:36

## 2025-01-17 RX ADMIN — ANTISEPTIC SURGICAL SCRUB 1 APPLICATION(S): 0.04 SOLUTION TOPICAL at 21:56

## 2025-01-17 RX ADMIN — FAMOTIDINE 40 MILLIGRAM(S): 10 INJECTION INTRAVENOUS at 05:46

## 2025-01-17 RX ADMIN — FAMOTIDINE 40 MILLIGRAM(S): 10 INJECTION INTRAVENOUS at 17:06

## 2025-01-17 NOTE — PROGRESS NOTE ADULT - ASSESSMENT
ASSESSMENT/PLAN: 54F with stage IV breast ca (bone/lung) presents with BLE numbness, urinary retention, and constipation, MR reveals a T9 path compfx w/ circumferential epidural extension w/ cord comp (L>R) worst at T8-9. Kayleen 3.    NEURO:  #T9 compression fracture   flat   - neuro q 1 h   - dex 10 given at [facility] for pathologic fracture  cont dex 4q6h  - preop for OR for possible T9 corpectomy, tumor resection w cage, T7-11 fusion  Pain: PRN analgesics tyl and oxy   Activity: [] OOB as tolerated [x] Bedrest [] PT [] OT [] PMNR        CV:  MAP goal above 85   give 1L bolus with  if not at goal start levo  hold lopressor     PULM:  Incentive spirometry  on room air, SAO2> 92     RENAL:  Fluids: NS @ 100  #urinary retention: place ch     GI:  Diet: NPO except meds  GI prophylaxis [] not indicated [] PPI [] other: pepcid, home med   Bowel regimen [x] senna [] other: miralax, dulcolax  LBM: PTA       ENDO:   Goal euglycemia (-180)    HEME/ONC:  VTE prophylaxis: [x] SCDs [] chemoprophylaxis [] hold chemoprophylaxis due to: [] high risk of DVT/PE on admission due to:  CT CAP with IV performed for malignancy workup     ID:  afebrile   CODE STATUS:  [x] Full Code [] DNR [] DNI [] Palliative/Comfort Care    DISPOSITION:  [x] ICU [] Stroke Unit [] Floor [] EMU [] RCU [] PCU   ASSESSMENT/PLAN: 54F with stage IV breast ca (bone/lung) presents with BLE numbness, urinary retention, and constipation, MR reveals a T9 path compfx w/ circumferential epidural extension w/ cord comp (L>R) worst at T8-9. Kayleen 3.    NEURO:  #T9 compression fracture   - neuro Q1H   - dex 10 given at [facility] for pathologic fracture  - Cont dex 4q6h  - preop for OR for possible T9 corpectomy, tumor resection, T7-11 fusion  Pain: PRN analgesics tyl and oxy   Activity: [] OOB as tolerated [x] Bedrest [] PT [] OT [] PMNR  Keep flat but can sit upright for meals.    CV:  MAP goal >85  1/17 TTE LVEF 60%.    PULM:  Incentive spirometry  on room air, SAO2> 92     RENAL:  Fluids: NS @ 100  #urinary retention: place ch     GI:  Diet: NPO after midnight for OR  GI prophylaxis [] not indicated [] PPI [] other: pepcid, home med   Bowel regimen [x] senna [] other: miralax, dulcolax  LBM: PTA     ENDO:   Goal euglycemia (-180)    HEME/ONC:  VTE prophylaxis: SCDs  1/16 CT CAP with IV performed for malignancy workup showed pulmonary nodules and uterine lesion.    ID:  Afebrile     CODE STATUS:  [x] Full Code [] DNR [] DNI [] Palliative/Comfort Care    DISPOSITION:  [x] ICU [] Stroke Unit [] Floor [] EMU [] RCU [] PCU

## 2025-01-17 NOTE — PROGRESS NOTE ADULT - SUBJECTIVE AND OBJECTIVE BOX
HOSPITAL COURSE: 54F no AC AP, hx stage IV breast ca. (bone/lung) HER2+ ER+ (dx: 2010, onc [facility], sp lumpectomy/RT), LUE lymphedema, on daily chemo, p/f LBP a/w BLE numb, urinary retention, constipation. MR T w/ T9 path compfx w/ circumferential epidural extension w/ cord comp (L>R) worst at T8-9. Bilsky 3. Also w/ anterior paraspinal soft tissue extension, T8-10. Exam: T9-10 sensory level. BUE 5/5, BLE 5/5, no clonus. Subj RLE heaviness      Admission Scores  GCS: 15  HH:   MF:   NIHSS:   RASS:    CAM-ICU:   ICH:    24 hour Events:       Allergies    No Known Allergies    Intolerances        REVIEW OF SYSTEMS: [ ] Unable to Assess due to neurologic exam   [x] All ROS addressed below are non-contributory, except  Neuro: [ ] Headache [ ] Back pain [ ] Numbness [ ] Weakness [ ] Ataxia [ ] Dizziness [ ] Aphasia [ ] Dysarthria [ ] Visual disturbance  Resp: [ ] Shortness of breath/dyspnea, [ ] Orthopnea [ ] Cough  CV: [ ] Chest pain [ ] Palpitation [ ] Lightheadedness [ ] Syncope  Renal: [ ] Thirst [ ] Edema  GI: [ ] Nausea [ ] Emesis [ ] Abdominal pain [ ] Constipation [ ] Diarrhea  Hem: [ ] Hematemesis [ ] bright red blood per rectum  ID: [ ] Fever [ ] Chills [ ] Dysuria  ENT: [ ] Rhinorrhea      DEVICES:   [ ] Restraints [ ] ET tube [ ] central line [ ] arterial line [ ] ch [ ] NGT/OGT [ ] EVD [ ] LD [ ] ALYSIA/HMV [ ] Trach [ ] PEG [ ] Chest Tube     VITALS:   Vital Signs Last 24 Hrs  T(C): 37 (17 Jan 2025 04:20), Max: 37 (17 Jan 2025 04:20)  T(F): 98.6 (17 Jan 2025 04:20), Max: 98.6 (17 Jan 2025 04:20)  HR: 60 (17 Jan 2025 04:20) (60 - 64)  BP: 125/58 (17 Jan 2025 04:20) (119/53 - 142/60)  BP(mean): 68 (17 Jan 2025 03:30) (68 - 68)  RR: 13 (17 Jan 2025 04:20) (12 - 18)  SpO2: 99% (17 Jan 2025 04:20) (99% - 99%)    Parameters below as of 17 Jan 2025 04:20  Patient On (Oxygen Delivery Method): room air        LABS:                        8.7    5.43  )-----------( 260      ( 17 Jan 2025 02:51 )             27.8     01-17    140  |  103  |  11  ----------------------------<  107[H]  3.6   |  22  |  0.93         MEDICATION LEVELS:     IVF FLUIDS/MEDICATIONS:   MEDICATIONS  (STANDING):  bisacodyl 5 milliGRAM(s) Oral daily  chlorhexidine 4% Liquid 1 Application(s) Topical daily  famotidine    Tablet 40 milliGRAM(s) Oral two times a day  metoprolol tartrate 12.5 milliGRAM(s) Oral two times a day  polyethylene glycol 3350 17 Gram(s) Oral daily  senna 2 Tablet(s) Oral at bedtime  sodium chloride 0.9%. 1000 milliLiter(s) (75 mL/Hr) IV Continuous <Continuous>    MEDICATIONS  (PRN):  acetaminophen   IVPB .. 1000 milliGRAM(s) IV Intermittent every 6 hours PRN Mild Pain (1 - 3)  oxyCODONE    IR 5 milliGRAM(s) Oral every 4 hours PRN Moderate Pain (4 - 6)  oxyCODONE    IR 10 milliGRAM(s) Oral every 4 hours PRN Severe Pain (7 - 10)        IMAGING:  [facility] imaging: MR T/L: Multifocal osseous metastatic disease. Pathologic compression fracture at T9. There is epidural tumor extending from the T7-T8 level to the upper T10 level. At the T8-T9 level, epidural tumor nearly completely encases the thecal sac resulting in moderate to severe spinal canal stenosis with mild cord compression (Bilsky 3) and suspicion for mild central cord edema. There is enhancing paraspinous soft tissue mass at the T8, T9 and upper T10 levels bilaterally. Multifocal osseous metastatic disease. Mild lumbar spine degenerative changes.  No evidence of cauda equina compression or nerve root impingement.     EXAMINATION:  PHYSICAL EXAM:    Constitutional: No Acute Distress     Neurological: Awake, alert oriented to person, place and time, Following Commands, PERRL, EOMI, No Gaze Preference, Face Symmetrical, Speech Fluent, No dysmetria, No ataxia, No nystagmus     Motor exam:          Upper extremity                         Delt     Bicep     Tricep    HG                                                 R         5/5 5/5 5/5 5/5                                               L          5/5 5/5 5/5 5/5          Lower extremity                        HF         KF        KE       DF         PF                                                  R        5/5 5/5 5/5 5/5 5/5                                               L         5/5 5/5 5/5 5/5 5/5                                                 Sensation: [x] intact to light touch  [ ] decreased:     Pulmonary: Clear to Auscultation, No rales, No rhonchi, No wheezes     Cardiovascular: S1, S2, Regular rate and rhythm     Gastrointestinal: Soft, Non-tender, Non-distended     Extremities: No calf tenderness      HOSPITAL COURSE: 54F no AC AP, hx stage IV breast ca. (bone/lung) HER2+ ER+ (dx: 2010, onc [facility], sp lumpectomy/RT), LUE lymphedema, on daily chemo, p/f LBP a/w BLE numb, urinary retention, constipation. MR T w/ T9 path compfx w/ circumferential epidural extension w/ cord comp (L>R) worst at T8-9. Bilsky 3.      Admission Scores  GCS: 15      24 hour Events:     admitted to [facility]      PHYSICAL EXAM:    Constitutional: No Acute Distress     Neurological: Awake, alert oriented to person, place and time, Following Commands, PERRL, EOMI, No Gaze Preference, Face Symmetrical, Speech Fluent, No dysmetria, No ataxia, No nystagmus     Motor exam:          Upper extremity                         Delt     Bicep     Tricep    HG                                                 R         5/5        5/5        5/5       5/5                                               L          5/5        5/5        5/5       5/5          Lower extremity                        HF         KF        KE       DF         PF                                                  R        5/5        5/5        5/5       5/5         5/5                                               L         5/5        5/5       5/5       5/5          5/5                                                 Sensation: [x] intact to light touch  [ ] decreased:     Pulmonary: Clear to Auscultation, No rales, No rhonchi, No wheezes     Cardiovascular: S1, S2, Regular rate and rhythm     Gastrointestinal: Soft, Non-tender, Non-distended     Extremities: No calf tenderness     VITALS:   Vital Signs Last 24 Hrs  T(C): 37 (17 Jan 2025 04:20), Max: 37 (17 Jan 2025 04:20)  T(F): 98.6 (17 Jan 2025 04:20), Max: 98.6 (17 Jan 2025 04:20)  HR: 57 (17 Jan 2025 06:00) (56 - 64)  BP: 123/65 (17 Jan 2025 05:00) (119/53 - 142/60)  BP(mean): 87 (17 Jan 2025 05:00) (68 - 87)  RR: 12 (17 Jan 2025 06:00) (12 - 18)  SpO2: 99% (17 Jan 2025 06:00) (99% - 99%)    Parameters below as of 17 Jan 2025 04:20  Patient On (Oxygen Delivery Method): room air        Drips     Respiratory:        LABS:                        8.7    5.43  )-----------( 260      ( 17 Jan 2025 02:51 )             27.8     01-17    140  |  103  |  11  ----------------------------<  107[H]  3.6   |  22  |  0.93      CAPILLARY BLOOD GLUCOSE          I&O's Summary      Imaging   CXR     EKG     MEDICATION LEVELS:     IVF FLUIDS/MEDICATIONS:   MEDICATIONS  (STANDING):  bisacodyl 5 milliGRAM(s) Oral daily  chlorhexidine 4% Liquid 1 Application(s) Topical daily  famotidine    Tablet 40 milliGRAM(s) Oral two times a day  metoprolol tartrate 12.5 milliGRAM(s) Oral two times a day  polyethylene glycol 3350 17 Gram(s) Oral daily  senna 2 Tablet(s) Oral at bedtime  sodium chloride 0.9%. 1000 milliLiter(s) (75 mL/Hr) IV Continuous <Continuous>    MEDICATIONS  (PRN):  acetaminophen   IVPB .. 1000 milliGRAM(s) IV Intermittent every 6 hours PRN Mild Pain (1 - 3)  oxyCODONE    IR 5 milliGRAM(s) Oral every 4 hours PRN Moderate Pain (4 - 6)  oxyCODONE    IR 10 milliGRAM(s) Oral every 4 hours PRN Severe Pain (7 - 10)

## 2025-01-17 NOTE — ED PROVIDER NOTE - OBJECTIVE STATEMENT
ming attending- 54-year-old female past medical history of metastatic breast cancer and heart disease presenting with back pain for 3 days Monday night she started to feel bilateral lower back pain with lower extremity numbness bilaterally causing her difficulty ambulating urinary retention constipation CAT scan that she had done at that time was negative reportedly so came to the ER at [place] last chemotherapy January 2, 2024 denies recent trauma fevers chills cough diarrhea abdominal pain chest pain shortness of breath patient had MRIs at outside hospital showing compression of thoracic vertebrae level 9 concerning for spinal cord compression was transferred here for neurosurgery evaluation given Decadron 10 mg prior to transfer.

## 2025-01-17 NOTE — ED ADULT NURSE REASSESSMENT NOTE - NS ED NURSE REASSESS COMMENT FT1
Pt MRI resulted. Pt to be transferred to [facility] for [facility] as per MD Landis

## 2025-01-17 NOTE — PROGRESS NOTE ADULT - ASSESSMENT
ASSESSMENT/PLAN: 54F with stage IV breast ca (bone/lung) presents with BLE numbness, urinary retention, and constipation, MR reveals a T9 path compfx w/ circumferential epidural extension w/ cord comp (L>R) worst at T8-9. Kayleen 3.    NEURO:  - neuro q 1 h   - dex 10 given at [facility] for pathologic fracture  - preop for OR for possible T9 corpectomy, tumor resection w cage, T7-11 fusion  Pain: PRN analgesics tyl and oxy   Activity: [x] OOB as tolerated [] Bedrest [] PT [] OT [] PMNR    PULM:  Incentive spirometry  on room air, SAO2> 92     CV:  MAP goal above 85     RENAL:  Fluids: NS @ 75     GI:  Diet: NPO except meds  GI prophylaxis [] not indicated [] PPI [] other: pepcid, home med   Bowel regimen [x] senna [] other: miralax, dulcolax  LBM: PTA       ENDO:   Goal euglycemia (-180)    HEME/ONC:  VTE prophylaxis: [x] SCDs [] chemoprophylaxis [] hold chemoprophylaxis due to: [] high risk of DVT/PE on admission due to:  CT CAP with IV performed for malignancy workup     ID:  afebrile     MISC:    SOCIAL/FAMILY:  [x] awaiting [] updated at bedside [] family meeting    CODE STATUS:  [x] Full Code [] DNR [] DNI [] Palliative/Comfort Care    DISPOSITION:  [x] ICU [] Stroke Unit [] Floor [] EMU [] RCU [] PCU    [] Patient is at high risk of neurologic deterioration/death due to: cord compression     Time seen:  Time spent: 45 critical care minutes ASSESSMENT/PLAN: 54F with stage IV breast ca (bone/lung) presents with BLE numbness, urinary retention, and constipation, MR reveals a T9 path compfx w/ circumferential epidural extension w/ cord comp (L>R) worst at T8-9. Kayleen 3.    NEURO:  #T9 compression fracture   flat   - neuro q 1 h   - dex 10 given at [facility] for pathologic fracture  cont dex 4q6h  - preop for OR for possible T9 corpectomy, tumor resection w cage, T7-11 fusion  Pain: PRN analgesics tyl and oxy   Activity: [] OOB as tolerated [x] Bedrest [] PT [] OT [] PMNR        CV:  MAP goal above 85   give 1L bolus with  if not at goal start levo  hold lopressor     PULM:  Incentive spirometry  on room air, SAO2> 92     RENAL:  Fluids: NS @ 100  #urinary retention: place ch     GI:  Diet: NPO except meds  GI prophylaxis [] not indicated [] PPI [] other: pepcid, home med   Bowel regimen [x] senna [] other: miralax, dulcolax  LBM: PTA       ENDO:   Goal euglycemia (-180)    HEME/ONC:  VTE prophylaxis: [x] SCDs [] chemoprophylaxis [] hold chemoprophylaxis due to: [] high risk of DVT/PE on admission due to:  CT CAP with IV performed for malignancy workup     ID:  afebrile   CODE STATUS:  [x] Full Code [] DNR [] DNI [] Palliative/Comfort Care    DISPOSITION:  [x] ICU [] Stroke Unit [] Floor [] EMU [] RCU [] PCU

## 2025-01-17 NOTE — H&P ADULT - HISTORY OF PRESENT ILLNESS
54F no AC AP, hx stage IV breast ca. (bone/lung) HER2+ ER+ (dx: 2010, onc [facility], sp lumpectomy/RT), LUE lymphedema, on daily chemo, p/f LBP a/w BLE numb, urinary retention, constipation. MR T w/ T9 path compfx w/ circumferential epidural extension w/ cord comp (L>R) worst at T8-9. Bilsky 3. Also w/ anterior paraspinal soft tissue extension, T8-10. Exam: T9-10 sensory level. BUE 5/5, BLE 5/5, no clonus. Subj RLE heaviness.

## 2025-01-17 NOTE — ED PROVIDER NOTE - CLINICAL SUMMARY MEDICAL DECISION MAKING FREE TEXT BOX
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

## 2025-01-17 NOTE — PROGRESS NOTE ADULT - SUBJECTIVE AND OBJECTIVE BOX
HOSPITAL COURSE: 54F no AC AP, hx stage IV breast ca. (bone/lung) HER2+ ER+ (dx: 2010, onc [facility], sp lumpectomy/RT), LUE lymphedema, on daily chemo, p/f LBP a/w BLE numb, urinary retention, constipation. MR T w/ T9 path compfx w/ circumferential epidural extension w/ cord comp (L>R) worst at T8-9. Bilsky 3. Also w/ anterior paraspinal soft tissue extension, T8-10. Exam: T9-10 sensory level. BUE 5/5, BLE 5/5, no clonus. Subj RLE heaviness      Admission Scores  GCS: 15  HH:   MF:   NIHSS:   RASS:    CAM-ICU:   ICH:    24 hour Events:       Allergies    No Known Allergies    Intolerances        REVIEW OF SYSTEMS: [ ] Unable to Assess due to neurologic exam   [x] All ROS addressed below are non-contributory, except  Neuro: [ ] Headache [ ] Back pain [ ] Numbness [ ] Weakness [ ] Ataxia [ ] Dizziness [ ] Aphasia [ ] Dysarthria [ ] Visual disturbance  Resp: [ ] Shortness of breath/dyspnea, [ ] Orthopnea [ ] Cough  CV: [ ] Chest pain [ ] Palpitation [ ] Lightheadedness [ ] Syncope  Renal: [ ] Thirst [ ] Edema  GI: [ ] Nausea [ ] Emesis [ ] Abdominal pain [ ] Constipation [ ] Diarrhea  Hem: [ ] Hematemesis [ ] bright red blood per rectum  ID: [ ] Fever [ ] Chills [ ] Dysuria  ENT: [ ] Rhinorrhea      DEVICES:   [ ] Restraints [ ] ET tube [ ] central line [ ] arterial line [ ] ch [ ] NGT/OGT [ ] EVD [ ] LD [ ] ALYSIA/HMV [ ] Trach [ ] PEG [ ] Chest Tube     VITALS:   Vital Signs Last 24 Hrs  T(C): 37 (17 Jan 2025 04:20), Max: 37 (17 Jan 2025 04:20)  T(F): 98.6 (17 Jan 2025 04:20), Max: 98.6 (17 Jan 2025 04:20)  HR: 60 (17 Jan 2025 04:20) (60 - 64)  BP: 125/58 (17 Jan 2025 04:20) (119/53 - 142/60)  BP(mean): 68 (17 Jan 2025 03:30) (68 - 68)  RR: 13 (17 Jan 2025 04:20) (12 - 18)  SpO2: 99% (17 Jan 2025 04:20) (99% - 99%)    Parameters below as of 17 Jan 2025 04:20  Patient On (Oxygen Delivery Method): room air        LABS:                        8.7    5.43  )-----------( 260      ( 17 Jan 2025 02:51 )             27.8     01-17    140  |  103  |  11  ----------------------------<  107[H]  3.6   |  22  |  0.93         MEDICATION LEVELS:     IVF FLUIDS/MEDICATIONS:   MEDICATIONS  (STANDING):  bisacodyl 5 milliGRAM(s) Oral daily  chlorhexidine 4% Liquid 1 Application(s) Topical daily  famotidine    Tablet 40 milliGRAM(s) Oral two times a day  metoprolol tartrate 12.5 milliGRAM(s) Oral two times a day  polyethylene glycol 3350 17 Gram(s) Oral daily  senna 2 Tablet(s) Oral at bedtime  sodium chloride 0.9%. 1000 milliLiter(s) (75 mL/Hr) IV Continuous <Continuous>    MEDICATIONS  (PRN):  acetaminophen   IVPB .. 1000 milliGRAM(s) IV Intermittent every 6 hours PRN Mild Pain (1 - 3)  oxyCODONE    IR 5 milliGRAM(s) Oral every 4 hours PRN Moderate Pain (4 - 6)  oxyCODONE    IR 10 milliGRAM(s) Oral every 4 hours PRN Severe Pain (7 - 10)        IMAGING:  [facility] imaging: MR T/L: Multifocal osseous metastatic disease. Pathologic compression fracture at T9. There is epidural tumor extending from the T7-T8 level to the upper T10 level. At the T8-T9 level, epidural tumor nearly completely encases the thecal sac resulting in moderate to severe spinal canal stenosis with mild cord compression (Bilsky 3) and suspicion for mild central cord edema. There is enhancing paraspinous soft tissue mass at the T8, T9 and upper T10 levels bilaterally. Multifocal osseous metastatic disease. Mild lumbar spine degenerative changes.  No evidence of cauda equina compression or nerve root impingement.     EXAMINATION:  PHYSICAL EXAM:    Constitutional: No Acute Distress     Neurological: Awake, alert oriented to person, place and time, Following Commands, PERRL, EOMI, No Gaze Preference, Face Symmetrical, Speech Fluent, No dysmetria, No ataxia, No nystagmus     Motor exam:          Upper extremity                         Delt     Bicep     Tricep    HG                                                 R         5/5 5/5 5/5 5/5                                               L          5/5 5/5 5/5 5/5          Lower extremity                        HF         KF        KE       DF         PF                                                  R        5/5 5/5 5/5 5/5 5/5                                               L         5/5 5/5 5/5 5/5 5/5                                                 Sensation: [x] intact to light touch  [ ] decreased:     Pulmonary: Clear to Auscultation, No rales, No rhonchi, No wheezes     Cardiovascular: S1, S2, Regular rate and rhythm     Gastrointestinal: Soft, Non-tender, Non-distended     Extremities: No calf tenderness

## 2025-01-17 NOTE — ED PROVIDER NOTE - ATTENDING CONTRIBUTION TO CARE
I, Lee Parrish, performed a history and physical exam of the patient and discussed their management with the resident provider. I reviewed the resident care provider's note and agree with the documented findings and plan of care. I was present and available for all procedures.    See my full note for details

## 2025-01-17 NOTE — H&P ADULT - ASSESSMENT
Marlene, Penny  54F no AC AP, hx stage IV breast ca. (bone/lung) HER2+ ER+ (dx: 2010, onc [facility] rCum, sp lumpectomy/RT), LUE lymphedema, on daily chemo, p/f LBP a/w BLE numb, urinary retention, constipation. MR T w/ T9 path compfx w/ circumferential epidural extension w/ cord comp (L>R) worst at T8-9. Bilsky 3. Also w/ anterior paraspinal soft tissue extension, T8-10. Exam: T9-10 sensory level. BUE 5/5, BLE 5/5, no clonus. Subj RLE heaviness.   -Adm NSCU under Dr. Bolaños, q1 checks  -S/P Dex 10 at [facility]   -MAP goals >85   -CT CAP w/ IVC  -Heme/onc consult   -Preop for OR for possible T9 corpectomy, tumor rsxn w/ cage, T7-11 fusion. Added on to follow

## 2025-01-17 NOTE — ED ADULT NURSE NOTE - NSFALLHARMRISKINTERV_ED_ALL_ED

## 2025-01-17 NOTE — ED PROVIDER NOTE - PROGRESS NOTE DETAILS
Neurosurgery paged for consultation Prashanth Westbrook PGY3:  Pt seen by Neurosurg. They would like Pt to be admitted to NICU to Dr. JASON Bolaños.

## 2025-01-17 NOTE — ED ADULT NURSE NOTE - OBJECTIVE STATEMENT
53 yo female with a PMH of breast Ca c/b mets s/p L lumpectomy with lymph node removal in 2010 presents to the ED via EMS as a transfer from [facility] for neurosurgery eval. Patient was seen at [facility] due to B/L LE numbness since Monday and difficulty ambulating. Reports that  it was painful to ambulate and bear weight. Additionally complaining of abdominal bloating with some mild nausea. Patient had imaging done at [facility] and was found to have T9 spinal cord compression c/b mets. Was given 10mg PIV decadron PTA. Denies any falls, trauma. Ambulates with a cane PRN. Reports having chemo Z0ptnil. Neuro intact, + pulses. SAM spontaneously. Sensations equal and intact B/L in LE. Reports R feels weaker than L and feels "numb" from umbilical down. On cardiac monitor, team to speak to neurosurg for further eval.

## 2025-01-17 NOTE — ED ADULT NURSE NOTE - NS ED NURSE TRANSPORT WITH
NS @ 75ML/HR, ED Tech, Neuro MD/Cardiac Monitor/Defib/ACLS/Rescue Kit/O2/BVM/IV pump/ACLS Rescue Kit

## 2025-01-17 NOTE — PROGRESS NOTE ADULT - ASSESSMENT
ASSESSMENT/PLAN: 54F with stage IV breast ca (bone/lung) presents with BLE numbness, urinary retention, and constipation, MR reveals a T9 path compfx w/ circumferential epidural extension w/ cord comp (L>R) worst at T8-9. Kayleen 3.    NEURO:  - neuro q 1 h   - dex 10 given at [place] for pathologic fracture  - preop for OR for possible T9 corpectomy, tumor resection w cage, T7-11 fusion  Pain: PRN analgesics tyl and oxy   Activity: [x] OOB as tolerated [] Bedrest [] PT [] OT [] PMNR    PULM:  Incentive spirometry  on room air, SAO2> 92     CV:  MAP goal above 85     RENAL:  Fluids: NS @ 75     GI:  Diet: NPO except meds  GI prophylaxis [] not indicated [] PPI [] other: pepcid, home med   Bowel regimen [x] senna [] other: miralax, dulcolax  LBM: PTA       ENDO:   Goal euglycemia (-180)    HEME/ONC:  VTE prophylaxis: [x] SCDs [] chemoprophylaxis [] hold chemoprophylaxis due to: [] high risk of DVT/PE on admission due to:  CT CAP with IV performed for malignancy workup     ID:  afebrile     MISC:    SOCIAL/FAMILY:  [x] awaiting [] updated at bedside [] family meeting    CODE STATUS:  [x] Full Code [] DNR [] DNI [] Palliative/Comfort Care    DISPOSITION:  [x] ICU [] Stroke Unit [] Floor [] EMU [] RCU [] PCU    [] Patient is at high risk of neurologic deterioration/death due to: cord compression     Time seen:  Time spent: 45 critical care minutes

## 2025-01-17 NOTE — ED PROVIDER NOTE - PHYSICAL EXAMINATION
Well appearing and in NAD, head normal appearing atraumatic, trachea midline, no respiratory distress, lungs cta bilaterally, rrr no murmurs, soft NT ND abdomen, no visible extremity deformities, Alert and oriented, Bilateral lower extremity numbness and weakness 4-5 strength patient reports saddle anesthesia, As well as numbness from the infra umbilicus region inferiorly, skin warm and dry, normal affect and mood, no leg swelling, calf ttp or jvd

## 2025-01-17 NOTE — ED ADULT TRIAGE NOTE - SOURCE OF INFORMATION
Recorded as Task  Date: 10/24/2018 07:41 PM, Created By: Kandice Oropeza  Task Name: Answering Service  Assigned To: KOBE ANTONIO  Regarding Patient: FRANKLIN GONZALEZ, Status: Active  Comment:   Kandice Oropeza - 24 Oct 2018 7:41 PM    [employer] Message Sent  [employer] Documentation  The below message was copy and pasted from a [employer] page.      MESSAGE: Date/Time   10/24/2018 7:38 PM    Source   [facility] Contact Center     Department   ACC      Phone Number   [phone]     Method   Secure Text    Contacted   Kobe Antonio     Requested   Dereck Tran      Details   Healthcare Provider In-Hospital Doctor Matter       Message   [phone] ACC URGENT CALLER NAME: STEVEN RE: FRANKLIN GONZALEZ PATIENT 1957 PATIENT PCP: DR TRAN EXT #[id number] WANTS TO INFORM YOU THAT THE PATIENT HAD AN EXTREMELY LARGE STOOL WITH BLOOD IN IT & LOOSE. FORMID:[id number]        READ BACK MESSAGE TO PATIENT  KOBE ANTONIO - 24 Oct 2018 7:57 PM    TASK EDITED  10/24/18: 62 yo F with developmental delay and GERD with CC bloody stools. Nursing staff at [facility] called about blood in stool. Black tarry stool, watery with red chunks. Still coming out. At baseline all day. No hx of loss of consciousness. Advised staff to send patient to the ER ASAP. Staff agreed.  -FLAVIAW      Electronically signed by:KOBE ANTONIO M.D.  Oct 24 2018  8:12PM CST     EMS

## 2025-01-17 NOTE — PROGRESS NOTE ADULT - SUBJECTIVE AND OBJECTIVE BOX
HOSPITAL COURSE: 54F no AC AP, hx stage IV breast ca. (bone/lung) HER2+ ER+ (dx: 2010, onc [facility], sp lumpectomy/RT), LUE lymphedema, on daily chemo, p/f LBP a/w BLE numb, urinary retention, constipation. MR T w/ T9 path compfx w/ circumferential epidural extension w/ cord comp (L>R) worst at T8-9. Bilsky 3.      Admission Scores  GCS: 15      24 hour Events:     admitted to nasicu      PHYSICAL EXAM:    Constitutional: No Acute Distress     Neurological: Awake, alert oriented to person, place and time, Following Commands, PERRL, EOMI, No Gaze Preference, Face Symmetrical, Speech Fluent, No dysmetria, No ataxia, No nystagmus     Motor exam:          Upper extremity                         Delt     Bicep     Tricep    HG                                                 R         5/5        5/5        5/5       5/5                                               L          5/5        5/5        5/5       5/5          Lower extremity                        HF         KF        KE       DF         PF                                                  R        5/5        5/5        5/5       5/5         5/5                                               L         5/5        5/5       5/5       5/5          5/5                                                 Sensation: [x] intact to light touch  [ ] decreased:     Pulmonary: Clear to Auscultation, No rales, No rhonchi, No wheezes     Cardiovascular: S1, S2, Regular rate and rhythm     Gastrointestinal: Soft, Non-tender, Non-distended     Extremities: No calf tenderness     VITALS:   Vital Signs Last 24 Hrs  T(C): 37 (17 Jan 2025 04:20), Max: 37 (17 Jan 2025 04:20)  T(F): 98.6 (17 Jan 2025 04:20), Max: 98.6 (17 Jan 2025 04:20)  HR: 57 (17 Jan 2025 06:00) (56 - 64)  BP: 123/65 (17 Jan 2025 05:00) (119/53 - 142/60)  BP(mean): 87 (17 Jan 2025 05:00) (68 - 87)  RR: 12 (17 Jan 2025 06:00) (12 - 18)  SpO2: 99% (17 Jan 2025 06:00) (99% - 99%)    Parameters below as of 17 Jan 2025 04:20  Patient On (Oxygen Delivery Method): room air        Drips     Respiratory:        LABS:                        8.7    5.43  )-----------( 260      ( 17 Jan 2025 02:51 )             27.8     01-17    140  |  103  |  11  ----------------------------<  107[H]  3.6   |  22  |  0.93      CAPILLARY BLOOD GLUCOSE          I&O's Summary      Imaging   CXR     EKG     MEDICATION LEVELS:     IVF FLUIDS/MEDICATIONS:   MEDICATIONS  (STANDING):  bisacodyl 5 milliGRAM(s) Oral daily  chlorhexidine 4% Liquid 1 Application(s) Topical daily  famotidine    Tablet 40 milliGRAM(s) Oral two times a day  metoprolol tartrate 12.5 milliGRAM(s) Oral two times a day  polyethylene glycol 3350 17 Gram(s) Oral daily  senna 2 Tablet(s) Oral at bedtime  sodium chloride 0.9%. 1000 milliLiter(s) (75 mL/Hr) IV Continuous <Continuous>    MEDICATIONS  (PRN):  acetaminophen   IVPB .. 1000 milliGRAM(s) IV Intermittent every 6 hours PRN Mild Pain (1 - 3)  oxyCODONE    IR 5 milliGRAM(s) Oral every 4 hours PRN Moderate Pain (4 - 6)  oxyCODONE    IR 10 milliGRAM(s) Oral every 4 hours PRN Severe Pain (7 - 10)

## 2025-01-17 NOTE — PATIENT PROFILE ADULT - FALL HARM RISK - HARM RISK INTERVENTIONS

## 2025-01-18 ENCOUNTER — APPOINTMENT (OUTPATIENT)
Dept: NEUROSURGERY | Facility: HOSPITAL | Age: 55
End: 2025-01-18

## 2025-01-18 LAB
ADD ON TEST-SPECIMEN IN LAB: SIGNIFICANT CHANGE UP
ANION GAP SERPL CALC-SCNC: 12 MMOL/L — SIGNIFICANT CHANGE UP (ref 5–17)
ANION GAP SERPL CALC-SCNC: 13 MMOL/L — SIGNIFICANT CHANGE UP (ref 5–17)
ANISOCYTOSIS BLD QL: SIGNIFICANT CHANGE UP
BASOPHILS # BLD AUTO: 0 K/UL — SIGNIFICANT CHANGE UP (ref 0–0.2)
BASOPHILS NFR BLD AUTO: 0 % — SIGNIFICANT CHANGE UP (ref 0–2)
BUN SERPL-MCNC: 10 MG/DL — SIGNIFICANT CHANGE UP (ref 7–23)
BUN SERPL-MCNC: 11 MG/DL — SIGNIFICANT CHANGE UP (ref 7–23)
CALCIUM SERPL-MCNC: 8.9 MG/DL — SIGNIFICANT CHANGE UP (ref 8.4–10.5)
CALCIUM SERPL-MCNC: 9.3 MG/DL — SIGNIFICANT CHANGE UP (ref 8.4–10.5)
CHLORIDE SERPL-SCNC: 107 MMOL/L — SIGNIFICANT CHANGE UP (ref 96–108)
CHLORIDE SERPL-SCNC: 112 MMOL/L — HIGH (ref 96–108)
CO2 SERPL-SCNC: 20 MMOL/L — LOW (ref 22–31)
CO2 SERPL-SCNC: 20 MMOL/L — LOW (ref 22–31)
CREAT SERPL-MCNC: 0.68 MG/DL — SIGNIFICANT CHANGE UP (ref 0.5–1.3)
CREAT SERPL-MCNC: 0.68 MG/DL — SIGNIFICANT CHANGE UP (ref 0.5–1.3)
DACRYOCYTES BLD QL SMEAR: SLIGHT — SIGNIFICANT CHANGE UP
EGFR: 105 ML/MIN/1.73M2 — SIGNIFICANT CHANGE UP
EGFR: 105 ML/MIN/1.73M2 — SIGNIFICANT CHANGE UP
EOSINOPHIL # BLD AUTO: 0 K/UL — SIGNIFICANT CHANGE UP (ref 0–0.5)
EOSINOPHIL NFR BLD AUTO: 0 % — SIGNIFICANT CHANGE UP (ref 0–6)
GAS PNL BLDA: SIGNIFICANT CHANGE UP
GLUCOSE SERPL-MCNC: 146 MG/DL — HIGH (ref 70–99)
GLUCOSE SERPL-MCNC: 147 MG/DL — HIGH (ref 70–99)
HCT VFR BLD CALC: 24 % — LOW (ref 34.5–45)
HCT VFR BLD CALC: 27.5 % — LOW (ref 34.5–45)
HGB BLD-MCNC: 7.7 G/DL — LOW (ref 11.5–15.5)
HGB BLD-MCNC: 8.7 G/DL — LOW (ref 11.5–15.5)
HYPOCHROMIA BLD QL: SLIGHT — SIGNIFICANT CHANGE UP
LYMPHOCYTES # BLD AUTO: 0.72 K/UL — LOW (ref 1–3.3)
LYMPHOCYTES # BLD AUTO: 11.4 % — LOW (ref 13–44)
MACROCYTES BLD QL: SLIGHT — SIGNIFICANT CHANGE UP
MAGNESIUM SERPL-MCNC: 2.2 MG/DL — SIGNIFICANT CHANGE UP (ref 1.6–2.6)
MAGNESIUM SERPL-MCNC: 2.7 MG/DL — HIGH (ref 1.6–2.6)
MANUAL SMEAR VERIFICATION: SIGNIFICANT CHANGE UP
MCHC RBC-ENTMCNC: 31.1 PG — SIGNIFICANT CHANGE UP (ref 27–34)
MCHC RBC-ENTMCNC: 31.6 G/DL — LOW (ref 32–36)
MCHC RBC-ENTMCNC: 32.1 G/DL — SIGNIFICANT CHANGE UP (ref 32–36)
MCHC RBC-ENTMCNC: 32.5 PG — SIGNIFICANT CHANGE UP (ref 27–34)
MCV RBC AUTO: 101.3 FL — HIGH (ref 80–100)
MCV RBC AUTO: 98.2 FL — SIGNIFICANT CHANGE UP (ref 80–100)
METAMYELOCYTES # FLD: 0.9 % — HIGH (ref 0–0)
METAMYELOCYTES NFR BLD: 0.9 % — HIGH (ref 0–0)
MICROCYTES BLD QL: SLIGHT — SIGNIFICANT CHANGE UP
MONOCYTES # BLD AUTO: 0.5 K/UL — SIGNIFICANT CHANGE UP (ref 0–0.9)
MONOCYTES NFR BLD AUTO: 7.9 % — SIGNIFICANT CHANGE UP (ref 2–14)
MRSA PCR RESULT.: SIGNIFICANT CHANGE UP
MYELOCYTES NFR BLD: 2.6 % — HIGH (ref 0–0)
NEUTROPHILS # BLD AUTO: 4.77 K/UL — SIGNIFICANT CHANGE UP (ref 1.8–7.4)
NEUTROPHILS NFR BLD AUTO: 75.4 % — SIGNIFICANT CHANGE UP (ref 43–77)
NRBC # BLD: 11 /100 WBCS — HIGH (ref 0–0)
NRBC # BLD: 2 /100 WBCS — HIGH (ref 0–0)
NRBC BLD-RTO: 11 /100 WBCS — HIGH (ref 0–0)
NRBC BLD-RTO: 2 /100 WBCS — HIGH (ref 0–0)
OVALOCYTES BLD QL SMEAR: SLIGHT — SIGNIFICANT CHANGE UP
PHOSPHATE SERPL-MCNC: 3.3 MG/DL — SIGNIFICANT CHANGE UP (ref 2.5–4.5)
PHOSPHATE SERPL-MCNC: 3.8 MG/DL — SIGNIFICANT CHANGE UP (ref 2.5–4.5)
PLAT MORPH BLD: NORMAL — SIGNIFICANT CHANGE UP
PLATELET # BLD AUTO: 226 K/UL — SIGNIFICANT CHANGE UP (ref 150–400)
PLATELET # BLD AUTO: 319 K/UL — SIGNIFICANT CHANGE UP (ref 150–400)
POLYCHROMASIA BLD QL SMEAR: SLIGHT — SIGNIFICANT CHANGE UP
POTASSIUM SERPL-MCNC: 4.2 MMOL/L — SIGNIFICANT CHANGE UP (ref 3.5–5.3)
POTASSIUM SERPL-MCNC: 4.2 MMOL/L — SIGNIFICANT CHANGE UP (ref 3.5–5.3)
POTASSIUM SERPL-SCNC: 4.2 MMOL/L — SIGNIFICANT CHANGE UP (ref 3.5–5.3)
POTASSIUM SERPL-SCNC: 4.2 MMOL/L — SIGNIFICANT CHANGE UP (ref 3.5–5.3)
PROMYELOCYTES # FLD: 0.9 % — HIGH (ref 0–0)
PROMYELOCYTES NFR BLD: 0.9 % — HIGH (ref 0–0)
RBC # BLD: 2.37 M/UL — LOW (ref 3.8–5.2)
RBC # BLD: 2.8 M/UL — LOW (ref 3.8–5.2)
RBC # FLD: 22.1 % — HIGH (ref 10.3–14.5)
RBC # FLD: 22.5 % — HIGH (ref 10.3–14.5)
RBC BLD AUTO: ABNORMAL
S AUREUS DNA NOSE QL NAA+PROBE: SIGNIFICANT CHANGE UP
SCHISTOCYTES BLD QL AUTO: SLIGHT — SIGNIFICANT CHANGE UP
SODIUM SERPL-SCNC: 140 MMOL/L — SIGNIFICANT CHANGE UP (ref 135–145)
SODIUM SERPL-SCNC: 144 MMOL/L — SIGNIFICANT CHANGE UP (ref 135–145)
VARIANT LYMPHS # BLD: 0.9 % — SIGNIFICANT CHANGE UP (ref 0–6)
VARIANT LYMPHS NFR BLD MANUAL: 0.9 % — SIGNIFICANT CHANGE UP (ref 0–6)
WBC # BLD: 6.33 K/UL — SIGNIFICANT CHANGE UP (ref 3.8–10.5)
WBC # BLD: 7.55 K/UL — SIGNIFICANT CHANGE UP (ref 3.8–10.5)
WBC # FLD AUTO: 6.33 K/UL — SIGNIFICANT CHANGE UP (ref 3.8–10.5)
WBC # FLD AUTO: 7.55 K/UL — SIGNIFICANT CHANGE UP (ref 3.8–10.5)

## 2025-01-18 PROCEDURE — 88342 IMHCHEM/IMCYTCHM 1ST ANTB: CPT | Mod: 26,59

## 2025-01-18 PROCEDURE — 22614 ARTHRD PST TQ 1NTRSPC EA ADD: CPT

## 2025-01-18 PROCEDURE — 22610 ARTHRD PST TQ 1NTRSPC THRC: CPT

## 2025-01-18 PROCEDURE — 88341 IMHCHEM/IMCYTCHM EA ADD ANTB: CPT | Mod: 26

## 2025-01-18 PROCEDURE — 22842 INSERT SPINE FIXATION DEVICE: CPT

## 2025-01-18 PROCEDURE — 88360 TUMOR IMMUNOHISTOCHEM/MANUAL: CPT | Mod: 26

## 2025-01-18 PROCEDURE — 99291 CRITICAL CARE FIRST HOUR: CPT

## 2025-01-18 PROCEDURE — 0055T BONE SRGRY CMPTR CT/MRI IMAG: CPT

## 2025-01-18 PROCEDURE — 63276 BX/EXC XDRL SPINE LESN THRC: CPT

## 2025-01-18 PROCEDURE — 88377 M/PHMTRC ALYS ISHQUANT/SEMIQ: CPT | Mod: 26

## 2025-01-18 PROCEDURE — 88307 TISSUE EXAM BY PATHOLOGIST: CPT | Mod: 26

## 2025-01-18 PROCEDURE — 63055 DECOMPRESS SPINAL CORD THRC: CPT

## 2025-01-18 DEVICE — SCREW EVEREST POLY 6.5X35MM: Type: IMPLANTABLE DEVICE | Status: FUNCTIONAL

## 2025-01-18 DEVICE — SCREW EVEREST POLY 5.5X40MM: Type: IMPLANTABLE DEVICE | Status: FUNCTIONAL

## 2025-01-18 DEVICE — SCREW EVEREST POLY 6.5X40MM: Type: IMPLANTABLE DEVICE | Status: FUNCTIONAL

## 2025-01-18 DEVICE — SURGIFLO MATRIX WITH THROMBIN KIT: Type: IMPLANTABLE DEVICE | Status: FUNCTIONAL

## 2025-01-18 DEVICE — IMPLANTABLE DEVICE: Type: IMPLANTABLE DEVICE | Status: FUNCTIONAL

## 2025-01-18 DEVICE — KIT A-LINE 1LUM 18G X 16CM: Type: IMPLANTABLE DEVICE | Status: FUNCTIONAL

## 2025-01-18 DEVICE — SET SCREW EVEREST ATR: Type: IMPLANTABLE DEVICE | Status: FUNCTIONAL

## 2025-01-18 DEVICE — ROD TITANIUM ALLOY 5.5 X 500MM: Type: IMPLANTABLE DEVICE | Status: FUNCTIONAL

## 2025-01-18 DEVICE — SCREW EVEREST POLY 5.5X35MM: Type: IMPLANTABLE DEVICE | Status: FUNCTIONAL

## 2025-01-18 RX ORDER — BISACODYL 5 MG
5 TABLET, DELAYED RELEASE (ENTERIC COATED) ORAL EVERY 12 HOURS
Refills: 0 | Status: DISCONTINUED | OUTPATIENT
Start: 2025-01-18 | End: 2025-01-23

## 2025-01-18 RX ORDER — OXYCODONE HYDROCHLORIDE 30 MG/1
10 TABLET ORAL EVERY 4 HOURS
Refills: 0 | Status: DISCONTINUED | OUTPATIENT
Start: 2025-01-18 | End: 2025-01-21

## 2025-01-18 RX ORDER — BACTERIOSTATIC SODIUM CHLORIDE 0.9 %
1000 VIAL (ML) INJECTION
Refills: 0 | Status: DISCONTINUED | OUTPATIENT
Start: 2025-01-18 | End: 2025-01-19

## 2025-01-18 RX ORDER — ONDANSETRON 4 MG/1
4 TABLET, ORALLY DISINTEGRATING ORAL EVERY 6 HOURS
Refills: 0 | Status: DISCONTINUED | OUTPATIENT
Start: 2025-01-18 | End: 2025-01-23

## 2025-01-18 RX ORDER — MECOBAL/LEVOMEFOLAT CA/B6 PHOS 2-3-35 MG
1 TABLET ORAL DAILY
Refills: 0 | Status: DISCONTINUED | OUTPATIENT
Start: 2025-01-18 | End: 2025-01-23

## 2025-01-18 RX ORDER — POLYETHYLENE GLYCOL 3350 17 G/17G
17 POWDER, FOR SOLUTION ORAL DAILY
Refills: 0 | Status: DISCONTINUED | OUTPATIENT
Start: 2025-01-18 | End: 2025-01-22

## 2025-01-18 RX ORDER — ANTISEPTIC SURGICAL SCRUB 0.04 MG/ML
1 SOLUTION TOPICAL DAILY
Refills: 0 | Status: DISCONTINUED | OUTPATIENT
Start: 2025-01-18 | End: 2025-01-19

## 2025-01-18 RX ORDER — PANTOPRAZOLE 20 MG/1
40 TABLET, DELAYED RELEASE ORAL
Refills: 0 | Status: DISCONTINUED | OUTPATIENT
Start: 2025-01-18 | End: 2025-01-23

## 2025-01-18 RX ORDER — HYDROMORPHONE HYDROCHLORIDE 4 MG/ML
0.5 INJECTION, SOLUTION INTRAMUSCULAR; INTRAVENOUS; SUBCUTANEOUS EVERY 4 HOURS
Refills: 0 | Status: DISCONTINUED | OUTPATIENT
Start: 2025-01-18 | End: 2025-01-18

## 2025-01-18 RX ORDER — HYDROMORPHONE HYDROCHLORIDE 4 MG/ML
0.5 INJECTION, SOLUTION INTRAMUSCULAR; INTRAVENOUS; SUBCUTANEOUS ONCE
Refills: 0 | Status: DISCONTINUED | OUTPATIENT
Start: 2025-01-18 | End: 2025-01-18

## 2025-01-18 RX ORDER — OXYCODONE HYDROCHLORIDE 30 MG/1
5 TABLET ORAL EVERY 4 HOURS
Refills: 0 | Status: DISCONTINUED | OUTPATIENT
Start: 2025-01-18 | End: 2025-01-21

## 2025-01-18 RX ORDER — FAMOTIDINE 10 MG/ML
40 INJECTION INTRAVENOUS
Refills: 0 | Status: DISCONTINUED | OUTPATIENT
Start: 2025-01-18 | End: 2025-01-23

## 2025-01-18 RX ORDER — CEFAZOLIN SODIUM IN 0.9 % NACL 2 G/10 ML
2000 SYRINGE (ML) INTRAVENOUS EVERY 8 HOURS
Refills: 0 | Status: COMPLETED | OUTPATIENT
Start: 2025-01-18 | End: 2025-01-19

## 2025-01-18 RX ORDER — OXYCODONE HYDROCHLORIDE 30 MG/1
5 TABLET ORAL EVERY 6 HOURS
Refills: 0 | Status: DISCONTINUED | OUTPATIENT
Start: 2025-01-18 | End: 2025-01-18

## 2025-01-18 RX ORDER — SENNOSIDES 8.6 MG
2 TABLET ORAL AT BEDTIME
Refills: 0 | Status: DISCONTINUED | OUTPATIENT
Start: 2025-01-18 | End: 2025-01-23

## 2025-01-18 RX ORDER — METOPROLOL SUCCINATE 25 MG
12.5 TABLET, EXTENDED RELEASE 24 HR ORAL EVERY 12 HOURS
Refills: 0 | Status: DISCONTINUED | OUTPATIENT
Start: 2025-01-18 | End: 2025-01-19

## 2025-01-18 RX ADMIN — DEXAMETHASONE SODIUM PHOSPHATE 4 MILLIGRAM(S): 4 INJECTION, SOLUTION INTRA-ARTICULAR; INTRALESIONAL; INTRAMUSCULAR; INTRAVENOUS; SOFT TISSUE at 11:09

## 2025-01-18 RX ADMIN — HYDROMORPHONE HYDROCHLORIDE 0.5 MILLIGRAM(S): 4 INJECTION, SOLUTION INTRAMUSCULAR; INTRAVENOUS; SUBCUTANEOUS at 22:28

## 2025-01-18 RX ADMIN — DEXAMETHASONE SODIUM PHOSPHATE 4 MILLIGRAM(S): 4 INJECTION, SOLUTION INTRA-ARTICULAR; INTRALESIONAL; INTRAMUSCULAR; INTRAVENOUS; SOFT TISSUE at 05:10

## 2025-01-18 RX ADMIN — HYDROMORPHONE HYDROCHLORIDE 0.5 MILLIGRAM(S): 4 INJECTION, SOLUTION INTRAMUSCULAR; INTRAVENOUS; SUBCUTANEOUS at 23:02

## 2025-01-18 RX ADMIN — Medication 5 MILLIGRAM(S): at 11:09

## 2025-01-18 RX ADMIN — ONDANSETRON 4 MILLIGRAM(S): 4 TABLET, ORALLY DISINTEGRATING ORAL at 23:44

## 2025-01-18 RX ADMIN — FAMOTIDINE 40 MILLIGRAM(S): 10 INJECTION INTRAVENOUS at 05:11

## 2025-01-18 NOTE — PRE-ANESTHESIA EVALUATION ADULT - MALLAMPATI CLASS
Your child was seen in the emergency department today for evaluation of a foreign body in his left nostril.  We used suction to remove it and it came out in 2 separate pieces.  It does not appear there is anything else in his nostril, but we are providing you with the phone number for ENT in the event that you need to follow-up.  Please follow-up with your PCP in 2 days as scheduled to follow-up.  Please watch for signs of infection such as fevers, abnormal discharge, worsening pain, or swelling.  
oral
Class I (easy) - visualization of the soft palate, fauces, uvula, and both anterior and posterior pillars

## 2025-01-18 NOTE — PROGRESS NOTE ADULT - ASSESSMENT
ASSESSMENT/PLAN: 54F with stage IV breast ca (bone/lung) presents with BLE numbness, urinary retention, and constipation, MR reveals a T9 path compfx w/ circumferential epidural extension w/ cord comp (L>R) worst at T8-9. Bilsky 3.    NEURO:  - POD0 b/l transpedicular T9 decompression, T8-10 laminectomy, T7-11 fusion.   - Monitor 2 HMV drain outpts.   - Neurochecks Q1H   Pain: PRN analgesics tyl and oxy   Activity: [] OOB as tolerated [] Bedrest [X] PT [X] OT [] PMNR    CV:  -160, MAP goal >65.  1/17 TTE LVEF 60%, diastolic dysfunction   Beta blockers on hold 2/2 bradycardia     PULM:  Incentive spirometry  on room air, SpO2> 92     RENAL:  Fluids: NS @ 100  #urinary retention: Pepe     GI:  Diet: Regular after passing dysphagia  GI prophylaxis [] not indicated [X] PPI [] other: pepcid, home meds   Bowel regimen [x] senna [] other: miralax, dulcolax  LBM: PTA     ENDO:   Goal euglycemia (-180)    HEME/ONC:  VTE prophylaxis: SCDs  1/16 CT CAP with IV performed for malignancy workup showed pulmonary nodules and uterine lesion.    ID:  Postop Ancef.      CODE STATUS:  [x] Full Code [] DNR [] DNI [] Palliative/Comfort Care    DISPOSITION:  [x] ICU [] Stroke Unit [] Floor [] EMU [] RCU [] PCU

## 2025-01-18 NOTE — PROGRESS NOTE ADULT - ASSESSMENT
Spinal cord compression secondary to metastatic breast cancer.  Due to the cord  compression she needs a laminectomy and fusion with the main goal to decompress the spinal cord circumferentially to allow for future radiation.  Risks including bleeding, increased neurologic deficit and need for future surgery where discussed. We mutually agreed to proceed with surgery. T7 to T 11 laminectomy fusion, removal of tumor and decompression of the spinal cord.

## 2025-01-18 NOTE — PRE-ANESTHESIA EVALUATION ADULT - NSPROPOSEDPROCEDFT_GEN_ALL_CORE
T7 to T 11 laminectomy fusion, removal of tumor and decompression of the spinal cord. T7 to T11 laminectomy fusion, removal of tumor and decompression of the spinal cord.

## 2025-01-18 NOTE — PRE-ANESTHESIA EVALUATION ADULT - NSANTHOSAYNRD_GEN_A_CORE
No. AMARILIS screening performed.  STOP BANG Legend: 0-2 = LOW Risk; 3-4 = INTERMEDIATE Risk; 5-8 = HIGH Risk

## 2025-01-18 NOTE — PROGRESS NOTE ADULT - SUBJECTIVE AND OBJECTIVE BOX
She feels better today.  AVSS  LE motor 5/5 bilaterally  Sensation decreased light touch left leg.        54F no AC AP, hx stage IV breast ca. (bone/lung) HER2+ ER+ (dx: 2010, onc [facility] C[facility], sp lumpectomy/RT), LUE lymphedema, on daily chemo, p/f LBP a/w BLE numb, urinary retention, constipation. MR T w/ T9 path compfx w/ circumferential epidural extension w/ cord comp (L>R) worst at T8-9. Bilsky 3.  She feels better today.  AVSS  LE motor 5/5 bilaterally  Sensation decreased light touch left leg.

## 2025-01-18 NOTE — PROGRESS NOTE ADULT - SUBJECTIVE AND OBJECTIVE BOX
HOSPITAL COURSE: 54F no AC AP, hx stage IV breast ca. (bone/lung) HER2+ ER+ (dx: 2010, onc Yani Crum, sp lumpectomy/RT), LUE lymphedema, on daily chemo, p/f LBP a/w BLE numb, urinary retention, constipation. MR T w/ T9 path compfx w/ circumferential epidural extension w/ cord comp (L>R) worst at T8-9. Bilsky 3.      Admission Scores  GCS: 15      24 hour Events:     admitted to nasicu      PHYSICAL EXAM:    Constitutional: No Acute Distress     Neurological: Awake, alert oriented to person, place and time, Following Commands, PERRL, EOMI, No Gaze Preference, Face Symmetrical, Speech Fluent, No dysmetria, No ataxia, No nystagmus     Motor exam:          Upper extremity                         Delt     Bicep     Tricep    HG                                                 R         5/5        5/5        5/5       5/5                                               L          5/5        5/5        5/5       5/5          Lower extremity                        HF         KF        KE       DF         PF                                                  R        5/5        5/5        5/5       5/5         5/5                                               L         5/5        5/5       5/5       5/5          5/5                                                 Sensation: [x] intact to light touch  [ ] decreased:     Pulmonary: Clear to Auscultation, No rales, No rhonchi, No wheezes     Cardiovascular: S1, S2, Regular rate and rhythm     Gastrointestinal: Soft, Non-tender, Non-distended     Extremities: No calf tenderness     ICU Vital Signs Last 24 Hrs  T(C): 36.7 (18 Jan 2025 03:00), Max: 36.9 (17 Jan 2025 19:00)  T(F): 98 (18 Jan 2025 03:00), Max: 98.4 (17 Jan 2025 19:00)  HR: 51 (18 Jan 2025 05:15) (48 - 86)  BP: --  BP(mean): --  ABP: 117/60 (18 Jan 2025 05:15) (108/54 - 147/73)  ABP(mean): 86 (18 Jan 2025 05:15) (73 - 102)  RR: 16 (18 Jan 2025 05:00) (10 - 24)  SpO2: 97% (18 Jan 2025 05:15) (91% - 100%)    O2 Parameters below as of 17 Jan 2025 19:00  Patient On (Oxygen Delivery Method): room air                          8.7    7.55  )-----------( 319      ( 18 Jan 2025 00:51 )             27.5   01-18    140  |  107  |  11  ----------------------------<  147[H]  4.2   |  20[L]  |  0.68    Ca    8.9      18 Jan 2025 00:51  Phos  3.3     01-18  Mg     2.2     01-18    TPro  7.2  /  Alb  4.0  /  TBili  0.7  /  DBili  x   /  AST  17  /  ALT  10  /  AlkPhos  69  01-17          I&O's Summary      Imaging   CXR     EKG     MEDICATION LEVELS:     IVF FLUIDS/MEDICATIONS:   MEDICATIONS  (STANDING):  bisacodyl 5 milliGRAM(s) Oral daily  chlorhexidine 4% Liquid 1 Application(s) Topical daily  famotidine    Tablet 40 milliGRAM(s) Oral two times a day  metoprolol tartrate 12.5 milliGRAM(s) Oral two times a day  polyethylene glycol 3350 17 Gram(s) Oral daily  senna 2 Tablet(s) Oral at bedtime  sodium chloride 0.9%. 1000 milliLiter(s) (75 mL/Hr) IV Continuous <Continuous>    MEDICATIONS  (PRN):  acetaminophen   IVPB .. 1000 milliGRAM(s) IV Intermittent every 6 hours PRN Mild Pain (1 - 3)  oxyCODONE    IR 5 milliGRAM(s) Oral every 4 hours PRN Moderate Pain (4 - 6)  oxyCODONE    IR 10 milliGRAM(s) Oral every 4 hours PRN Severe Pain (7 - 10)

## 2025-01-18 NOTE — PROGRESS NOTE ADULT - ATTENDING COMMENTS
54yo woman T8-10 epidural tumor, T9 fx  awake, SAM 5/5  OR today  MAP>85 on phenylephrine 0.5  vitals reviewed, bradycardia  labs reviewed, hgb 8.7  BLE dopplers negative 1/17  TTE EF 55%, diastolic dysfunction  decadron 4mg Q6  RA/IS, O2sat>92%  NPO preop  LBM PTA  IVF while NPO  keep ch pre op  hold metoprolol 12.5mg Q12 while on pressors  re exam post op   not critically ill but medically complex 55min spent 54yo woman T8-10 epidural tumor, T9 fx  awake, SAM 5/5  OR today  MAP>85 on phenylephrine 0.5  vitals reviewed, bradycardia  labs reviewed, hgb 8.7  BLE dopplers negative 1/17  TTE EF 55%, diastolic dysfunction  decadron 4mg Q6  RA/IS, O2sat>92%  NPO preop  LBM PTA  IVF while NPO  keep ch pre op  hold metoprolol 12.5mg Q12 while on pressors  re exam post op

## 2025-01-18 NOTE — BRIEF OPERATIVE NOTE - COMMENTS
No issues, T7-T11 Thoracic fusion with T8-T10 laminectomy and T9 B/L transpedicular Decompression for resection of thoracic tumor

## 2025-01-18 NOTE — PRE-ANESTHESIA EVALUATION ADULT - NSANTHPEFT_GEN_ALL_CORE
pt morbidly obese  METS>4, pt recently did the walk or breast cancer.  now (since Monday) with LE numbness, ataxia.

## 2025-01-18 NOTE — PROGRESS NOTE ADULT - ASSESSMENT
ASSESSMENT/PLAN: 54F with stage IV breast ca (bone/lung) presents with BLE numbness, urinary retention, and constipation, MR reveals a T9 path compfx w/ circumferential epidural extension w/ cord comp (L>R) worst at T8-9. Kayleen 3.    NEURO:  #T9 compression fracture   - neuro Q1H   - dex 10 given at [place] [facility] for pathologic fracture  - Cont dex 4q6h  - preop for OR for possible T9 corpectomy, tumor resection, T7-11 fusion  Pain: PRN analgesics tyl and oxy   Activity: [] OOB as tolerated [x] Bedrest [] PT [] OT [] PMNR  Keep flat but can sit upright for meals.    CV:  MAP goal >85  1/17 TTE LVEF 60%.    PULM:  Incentive spirometry  on room air, SAO2> 92     RENAL:  Fluids: NS @ 100  #urinary retention: place ch     GI:  Diet: NPO after midnight for OR  GI prophylaxis [] not indicated [] PPI [] other: pepcid, home med   Bowel regimen [x] senna [] other: miralax, dulcolax  LBM: PTA     ENDO:   Goal euglycemia (-180)    HEME/ONC:  VTE prophylaxis: SCDs  1/16 CT CAP with IV performed for malignancy workup showed pulmonary nodules and uterine lesion.    ID:  Afebrile     CODE STATUS:  [x] Full Code [] DNR [] DNI [] Palliative/Comfort Care    DISPOSITION:  [x] ICU [] Stroke Unit [] Floor [] EMU [] RCU [] PCU   ASSESSMENT/PLAN: 54F with stage IV breast ca (bone/lung) presents with BLE numbness, urinary retention, and constipation, MR reveals a T9 path compfx w/ circumferential epidural extension w/ cord comp (L>R) worst at T8-9. Kayleen 3.    NEURO:  #T9 compression fracture   - neuro Q1H   - dex 10 given at [place] [facility] for pathologic fracture  - Cont dex 4q6h  - preop for OR for possible T9 corpectomy, tumor resection, T7-11 fusion  Pain: PRN analgesics tyl and oxy   Activity: [] OOB as tolerated [x] Bedrest [] PT [] OT [] PMNR  Keep flat but can sit upright for meals.    CV:  MAP goal >85  1/17 TTE LVEF 60%, diastolic dysfunction   B blockers on hols 2/2 bradycardia   Twan for MAp augmentation     PULM:  Incentive spirometry  on room air, SAO2> 92     RENAL:  Fluids: NS @ 100  #urinary retention:  ch     GI:  Diet: NPO after midnight for OR  GI prophylaxis [] not indicated [] PPI [] other: pepcid, home med   Bowel regimen [x] senna [] other: miralax, dulcolax  LBM: PTA     ENDO:   Goal euglycemia (-180)    HEME/ONC:  VTE prophylaxis: SCDs  1/16 CT CAP with IV performed for malignancy workup showed pulmonary nodules and uterine lesion.  Chemoppx on hold, OR today will restart 24 hours after surgery     ID:  Afebrile     CODE STATUS:  [x] Full Code [] DNR [] DNI [] Palliative/Comfort Care    DISPOSITION:  [x] ICU [] Stroke Unit [] Floor [] EMU [] RCU [] PCU

## 2025-01-19 LAB
HCT VFR BLD CALC: 22.6 % — LOW (ref 34.5–45)
HCT VFR BLD CALC: 25.3 % — LOW (ref 34.5–45)
HGB BLD-MCNC: 7.1 G/DL — LOW (ref 11.5–15.5)
HGB BLD-MCNC: 8.1 G/DL — LOW (ref 11.5–15.5)
MCHC RBC-ENTMCNC: 30.7 PG — SIGNIFICANT CHANGE UP (ref 27–34)
MCHC RBC-ENTMCNC: 31.4 G/DL — LOW (ref 32–36)
MCHC RBC-ENTMCNC: 32 G/DL — SIGNIFICANT CHANGE UP (ref 32–36)
MCHC RBC-ENTMCNC: 32 PG — SIGNIFICANT CHANGE UP (ref 27–34)
MCV RBC AUTO: 101.8 FL — HIGH (ref 80–100)
MCV RBC AUTO: 95.8 FL — SIGNIFICANT CHANGE UP (ref 80–100)
NRBC # BLD: 4 /100 WBCS — HIGH (ref 0–0)
NRBC # BLD: 5 /100 WBCS — HIGH (ref 0–0)
NRBC BLD-RTO: 4 /100 WBCS — HIGH (ref 0–0)
NRBC BLD-RTO: 5 /100 WBCS — HIGH (ref 0–0)
PLATELET # BLD AUTO: 198 K/UL — SIGNIFICANT CHANGE UP (ref 150–400)
PLATELET # BLD AUTO: 224 K/UL — SIGNIFICANT CHANGE UP (ref 150–400)
RBC # BLD: 2.22 M/UL — LOW (ref 3.8–5.2)
RBC # BLD: 2.64 M/UL — LOW (ref 3.8–5.2)
RBC # FLD: 22.2 % — HIGH (ref 10.3–14.5)
RBC # FLD: 22.5 % — HIGH (ref 10.3–14.5)
WBC # BLD: 7.17 K/UL — SIGNIFICANT CHANGE UP (ref 3.8–10.5)
WBC # BLD: 7.42 K/UL — SIGNIFICANT CHANGE UP (ref 3.8–10.5)
WBC # FLD AUTO: 7.17 K/UL — SIGNIFICANT CHANGE UP (ref 3.8–10.5)
WBC # FLD AUTO: 7.42 K/UL — SIGNIFICANT CHANGE UP (ref 3.8–10.5)

## 2025-01-19 PROCEDURE — 72131 CT LUMBAR SPINE W/O DYE: CPT | Mod: 26

## 2025-01-19 PROCEDURE — 72128 CT CHEST SPINE W/O DYE: CPT | Mod: 26

## 2025-01-19 PROCEDURE — 99232 SBSQ HOSP IP/OBS MODERATE 35: CPT

## 2025-01-19 RX ORDER — METOPROLOL SUCCINATE 25 MG
12.5 TABLET, EXTENDED RELEASE 24 HR ORAL EVERY 12 HOURS
Refills: 0 | Status: DISCONTINUED | OUTPATIENT
Start: 2025-01-19 | End: 2025-01-23

## 2025-01-19 RX ORDER — BACTERIOSTATIC SODIUM CHLORIDE 0.9 %
1000 VIAL (ML) INJECTION ONCE
Refills: 0 | Status: COMPLETED | OUTPATIENT
Start: 2025-01-19 | End: 2025-01-19

## 2025-01-19 RX ORDER — ENOXAPARIN SODIUM 100 MG/ML
40 INJECTION SUBCUTANEOUS
Refills: 0 | Status: DISCONTINUED | OUTPATIENT
Start: 2025-01-20 | End: 2025-01-23

## 2025-01-19 RX ORDER — METOCLOPRAMIDE 10 MG/1
10 TABLET ORAL ONCE
Refills: 0 | Status: COMPLETED | OUTPATIENT
Start: 2025-01-19 | End: 2025-01-19

## 2025-01-19 RX ADMIN — Medication 1 TABLET(S): at 12:26

## 2025-01-19 RX ADMIN — ONDANSETRON 4 MILLIGRAM(S): 4 TABLET, ORALLY DISINTEGRATING ORAL at 07:35

## 2025-01-19 RX ADMIN — POLYETHYLENE GLYCOL 3350 17 GRAM(S): 17 POWDER, FOR SOLUTION ORAL at 12:26

## 2025-01-19 RX ADMIN — Medication 100 MILLILITER(S): at 00:12

## 2025-01-19 RX ADMIN — OXYCODONE HYDROCHLORIDE 10 MILLIGRAM(S): 30 TABLET ORAL at 01:20

## 2025-01-19 RX ADMIN — PANTOPRAZOLE 40 MILLIGRAM(S): 20 TABLET, DELAYED RELEASE ORAL at 06:19

## 2025-01-19 RX ADMIN — OXYCODONE HYDROCHLORIDE 10 MILLIGRAM(S): 30 TABLET ORAL at 00:50

## 2025-01-19 RX ADMIN — FAMOTIDINE 40 MILLIGRAM(S): 10 INJECTION INTRAVENOUS at 17:43

## 2025-01-19 RX ADMIN — METOCLOPRAMIDE 10 MILLIGRAM(S): 10 TABLET ORAL at 11:26

## 2025-01-19 RX ADMIN — Medication 12.5 MILLIGRAM(S): at 17:43

## 2025-01-19 RX ADMIN — Medication 5 MILLIGRAM(S): at 12:28

## 2025-01-19 RX ADMIN — ANTISEPTIC SURGICAL SCRUB 1 APPLICATION(S): 0.04 SOLUTION TOPICAL at 12:27

## 2025-01-19 RX ADMIN — Medication 100 MILLIGRAM(S): at 06:18

## 2025-01-19 RX ADMIN — Medication 2 TABLET(S): at 22:23

## 2025-01-19 RX ADMIN — Medication 100 MILLILITER(S): at 08:05

## 2025-01-19 RX ADMIN — Medication 1000 MILLILITER(S): at 08:05

## 2025-01-19 RX ADMIN — Medication 100 MILLIGRAM(S): at 13:15

## 2025-01-19 RX ADMIN — FAMOTIDINE 40 MILLIGRAM(S): 10 INJECTION INTRAVENOUS at 07:36

## 2025-01-19 NOTE — PHYSICAL THERAPY INITIAL EVALUATION ADULT - PHYSICAL ASSIST/NONPHYSICAL ASSIST: SUPINE/SIT, REHAB EVAL
Airway  Date/Time: 12/6/2019 11:10 AM  Performed by: Mike Denis MD  Authorized by: Mike Denis MD     Final Airway Type:  Endotracheal airway  Final Endotracheal Airway*:  ETT  ETT Size (mm)*:  8.0  Cuff*:  Regular  Technique Used for Successful ETT Placement:  Video laryngoscopy (sutton)  Devices/Methods Used in Placement*:  Mask  Intubation Procedure*:  Preoxygenation, ETCO2, Atraumatic, Dentition Unchanged and Phaynx Clear  Insertion Site:  Oral  Blade Type*:  MAC  Blade Size*:  4  Cuff Volume (mL):  8  Secured at (cm)*:  23  Placement Verified by: auscultation and capnometry    Glottic View*:  1 - full view of glottis  Attempts*:  1   Patient Identified, Procedure confirmed, Emergency equipment available and Safety protocols followed  Location:  OR  Urgency:  Elective  Difficult Airway: No    Indications for Airway Management:  Anesthesia  Mask Difficulty Assessment:  1 - vent by mask  Performed By:  Anesthesiologist  Anesthesiologist:  iMke Denis MD  Start Time:  12/6/2019 11:10 AM         log roll technique, adherence to spinal prec/verbal cues/1 person assist

## 2025-01-19 NOTE — PROGRESS NOTE ADULT - SUBJECTIVE AND OBJECTIVE BOX
Patient seen and examined at bedside.    --Anticoagulation--    T(C): 36.8 (01-18-25 @ 23:00), Max: 36.9 (01-18-25 @ 07:00)  HR: 58 (01-19-25 @ 01:00) (44 - 94)  BP: 123/65 (01-18-25 @ 16:11) (123/65 - 123/65)  RR: 17 (01-19-25 @ 01:00) (8 - 29)  SpO2: 100% (01-19-25 @ 01:00) (93% - 100%)  Wt(kg): --    Exam: Awake, Ox3, no facial, no drift, FC, SAM 5/5, LLE numbness

## 2025-01-19 NOTE — PHYSICAL THERAPY INITIAL EVALUATION ADULT - PERTINENT HX OF CURRENT PROBLEM, REHAB EVAL
54F no AC AP, hx stage IV breast ca. (bone/lung) HER2+ ER+ (dx: 2010, onc Yani Crum, sp lumpectomy/RT), LUE lymphedema, on daily chemo, p/f LBP a/w BLE numb, urinary retention, constipation. MR T w/ T9 path compfx w/ circumferential epidural extension w/ cord comp (L>R) worst at T8-9. Kayleen 3. 54F no AC AP, hx stage IV breast ca. (bone/lung) HER2+ ER+ (dx: 2010, onc Yani Crum, sp lumpectomy/RT), LUE lymphedema, on daily chemo, p/f LBP a/w BLE numb, urinary retention, constipation. MR T w/ T9 path compfx w/ circumferential epidural extension w/ cord comp (L>R) worst at T8-9. Kayleen 3. Pt s/p b/l transpedicular T9 decompression, T8-10 laminectomy, T7-11 fusion on 1/18/25.

## 2025-01-19 NOTE — PHYSICAL THERAPY INITIAL EVALUATION ADULT - ADDITIONAL COMMENTS
IND without AD at baseline; uses cane in community as needed; lives with sister in house with 4 steps to enter +HR; 14 steps to 2nd floor bedroom

## 2025-01-19 NOTE — PHYSICAL THERAPY INITIAL EVALUATION ADULT - BED MOBILITY TRAINING, PT EVAL
GOAL: Patient will perform bed mobility independently in 4 weeks; with good adherence to spinal precautions.

## 2025-01-19 NOTE — PHYSICAL THERAPY INITIAL EVALUATION ADULT - PHYSICAL ASSIST/NONPHYSICAL ASSIST: STAND/SIT, REHAB EVAL
Instructions left on voicemail with callback number.    nonverbal cues (demo/gestures)/1 person assist

## 2025-01-19 NOTE — PHYSICAL THERAPY INITIAL EVALUATION ADULT - PLANNED THERAPY INTERVENTIONS, PT EVAL
GOAL: Pt will negotiate up/down   steps with   handrail ascending using least restrictive assistive device, independently in 4 weeks/balance training/bed mobility training/gait training/strengthening/transfer training

## 2025-01-19 NOTE — PHYSICAL THERAPY INITIAL EVALUATION ADULT - NSPTDMEREC_GEN_A_CORE
TBD pending ambulation assessment Patient will require a rolling walker at home due to decreased balance, strength and endurance to help complete MRADL's (Mobility related aides for daily living)./rolling walker

## 2025-01-19 NOTE — PHYSICAL THERAPY INITIAL EVALUATION ADULT - GAIT TRAINING, PT EVAL
GOAL: Patient will ambulate 300 feet independently with least restrictive assistive device in 4 weeks.

## 2025-01-19 NOTE — PROGRESS NOTE ADULT - SUBJECTIVE AND OBJECTIVE BOX
HOSPITAL COURSE: 54F no AC AP, hx stage IV breast ca. (bone/lung) HER2+ ER+ (dx: 2010, onc Yani Crum, sp lumpectomy/RT), LUE lymphedema, on daily chemo, p/f LBP a/w BLE numb, urinary retention, constipation. MR T w/ T9 path compfx w/ circumferential epidural extension w/ cord comp (L>R) worst at T8-9. Bilsky 3.      Admission Scores  GCS: 15      24 hour Events:     S/p T7-T11 Thoracic fusion with T8-T10 laminectomy and T9 B/L transpedicular decompression for resection of thoracic tumor        ICU Vital Signs Last 24 Hrs  T(C): 36.8 (19 Jan 2025 03:00), Max: 36.9 (18 Jan 2025 07:00)  T(F): 98.3 (19 Jan 2025 03:00), Max: 98.4 (18 Jan 2025 07:00)  HR: 70 (19 Jan 2025 04:00) (44 - 94)  BP: 123/65 (18 Jan 2025 16:11) (123/65 - 123/65)  BP(mean): 87 (18 Jan 2025 16:11) (87 - 87)  ABP: 114/59 (19 Jan 2025 04:00) (105/56 - 171/72)  ABP(mean): 78 (19 Jan 2025 04:00) (69 - 106)  RR: 18 (19 Jan 2025 04:00) (8 - 29)  SpO2: 100% (19 Jan 2025 04:00) (93% - 100%)    O2 Parameters below as of 18 Jan 2025 21:45  Patient On (Oxygen Delivery Method): room air        LABS:                        7.7    6.33  )-----------( 226      ( 18 Jan 2025 22:12 )             24.0       01-18    144  |  112[H]  |  10  ----------------------------<  146[H]  4.2   |  20[L]  |  0.68    Ca    9.3      18 Jan 2025 22:12  Phos  3.8     01-18  Mg     2.7     01-18            ABG - ( 18 Jan 2025 20:47 )  pH, Arterial: 7.37  pH, Blood: x     /  pCO2: 37    /  pO2: 249   / HCO3: 21    / Base Excess: -3.5  /  SaO2: 99.9        I&O's Summary    17 Jan 2025 07:01  -  18 Jan 2025 07:00  --------------------------------------------------------  IN: 4138.5 mL / OUT: 3360 mL / NET: 778.5 mL    18 Jan 2025 07:01  -  19 Jan 2025 05:48  --------------------------------------------------------  IN: 1529.5 mL / OUT: 1295 mL / NET: 234.5 mL          Imaging   Reviewed.      IVF FLUIDS/MEDICATIONS:   MEDICATIONS  (STANDING):  ceFAZolin   IVPB 2000 milliGRAM(s) IV Intermittent every 8 hours  chlorhexidine 4% Liquid 1 Application(s) Topical daily  famotidine    Tablet 40 milliGRAM(s) Oral two times a day  lisinopril 5 milliGRAM(s) Oral daily  metoprolol tartrate 12.5 milliGRAM(s) Oral every 12 hours  multivitamin 1 Tablet(s) Oral daily  pantoprazole    Tablet 40 milliGRAM(s) Oral before breakfast  polyethylene glycol 3350 17 Gram(s) Oral daily  senna 2 Tablet(s) Oral at bedtime  sodium chloride 0.9%. 1000 milliLiter(s) (100 mL/Hr) IV Continuous <Continuous>    MEDICATIONS  (PRN):  bisacodyl 5 milliGRAM(s) Oral every 12 hours PRN Constipation  ondansetron Injectable 4 milliGRAM(s) IV Push every 6 hours PRN Nausea and/or Vomiting  oxyCODONE    IR 10 milliGRAM(s) Oral every 4 hours PRN Severe Pain (7 - 10)  oxyCODONE    IR 5 milliGRAM(s) Oral every 4 hours PRN Moderate Pain (4 - 6)      PHYSICAL EXAM:    Constitutional: No Acute Distress     Neurological: Awake, alert oriented to person, place and time, Following Commands, PERRL, EOMI, No Gaze Preference, Face Symmetrical, Speech Fluent, No dysmetria, No ataxia, No nystagmus     Motor exam:          Upper extremity                         Delt     Bicep     Tricep    HG                                                 R         5/5 5/5 5/5 5/5                                               L          5/5 5/5 5/5 5/5          Lower extremity                        HF         KF        KE       DF         PF                                                  R        5/5 5/5 5/5 5/5 5/5                                               L         5/5 5/5 5/5 5/5 5/5                                                 Sensation: [x] intact to light touch  [ ] decreased:     Pulmonary: Clear to Auscultation, No rales, No rhonchi, No wheezes     Cardiovascular: S1, S2, Regular rate and rhythm     Gastrointestinal: Soft, Non-tender, Non-distended     Extremities: No calf tenderness

## 2025-01-19 NOTE — OCCUPATIONAL THERAPY INITIAL EVALUATION ADULT - PERTINENT HX OF CURRENT PROBLEM, REHAB EVAL
54F no AC AP, hx stage IV breast ca. (bone/lung) HER2+ ER+ (dx: 2010, onc Yani Crum, cordelia lumpectomy/RT), LUE lymphedema, on daily chemo, p/f LBP a/w BLE numb, urinary retention, constipation. MR T w/ T9 path compression fx w/ circumferential epidural extension w/ cord comp (L>R) worst at T8-9. Bilsky 3. Pt s/p b/l transpedicular T9 decompression, T8-10 laminectomy, T7-11 fusion on 1/18/25.     MRI THORACIC SPINE:  Multifocal osseous metastatic disease.  Pathologic compression fracture at T9.  There is epidural tumor extending from the T7-T8 level to the upper T10   level.

## 2025-01-19 NOTE — OCCUPATIONAL THERAPY INITIAL EVALUATION ADULT - ADL RETRAINING, OT EVAL
Pt will perform LB dressing with independence within 2 week. Pt will perform toileting with independence within 2 weeks.

## 2025-01-20 LAB
ANION GAP SERPL CALC-SCNC: 11 MMOL/L — SIGNIFICANT CHANGE UP (ref 5–17)
BUN SERPL-MCNC: 10 MG/DL — SIGNIFICANT CHANGE UP (ref 7–23)
CALCIUM SERPL-MCNC: 8.9 MG/DL — SIGNIFICANT CHANGE UP (ref 8.4–10.5)
CHLORIDE SERPL-SCNC: 104 MMOL/L — SIGNIFICANT CHANGE UP (ref 96–108)
CO2 SERPL-SCNC: 23 MMOL/L — SIGNIFICANT CHANGE UP (ref 22–31)
CREAT SERPL-MCNC: 0.66 MG/DL — SIGNIFICANT CHANGE UP (ref 0.5–1.3)
EGFR: 105 ML/MIN/1.73M2 — SIGNIFICANT CHANGE UP
GLUCOSE SERPL-MCNC: 145 MG/DL — HIGH (ref 70–99)
HCT VFR BLD CALC: 24.7 % — LOW (ref 34.5–45)
HGB BLD-MCNC: 7.9 G/DL — LOW (ref 11.5–15.5)
MAGNESIUM SERPL-MCNC: 2.2 MG/DL — SIGNIFICANT CHANGE UP (ref 1.6–2.6)
MCHC RBC-ENTMCNC: 30.6 PG — SIGNIFICANT CHANGE UP (ref 27–34)
MCHC RBC-ENTMCNC: 32 G/DL — SIGNIFICANT CHANGE UP (ref 32–36)
MCV RBC AUTO: 95.7 FL — SIGNIFICANT CHANGE UP (ref 80–100)
NRBC # BLD: 3 /100 WBCS — HIGH (ref 0–0)
NRBC BLD-RTO: 3 /100 WBCS — HIGH (ref 0–0)
PHOSPHATE SERPL-MCNC: 2.7 MG/DL — SIGNIFICANT CHANGE UP (ref 2.5–4.5)
PLATELET # BLD AUTO: 184 K/UL — SIGNIFICANT CHANGE UP (ref 150–400)
POTASSIUM SERPL-MCNC: 3.8 MMOL/L — SIGNIFICANT CHANGE UP (ref 3.5–5.3)
POTASSIUM SERPL-SCNC: 3.8 MMOL/L — SIGNIFICANT CHANGE UP (ref 3.5–5.3)
RBC # BLD: 2.58 M/UL — LOW (ref 3.8–5.2)
RBC # FLD: 21.9 % — HIGH (ref 10.3–14.5)
SODIUM SERPL-SCNC: 138 MMOL/L — SIGNIFICANT CHANGE UP (ref 135–145)
WBC # BLD: 6.59 K/UL — SIGNIFICANT CHANGE UP (ref 3.8–10.5)
WBC # FLD AUTO: 6.59 K/UL — SIGNIFICANT CHANGE UP (ref 3.8–10.5)

## 2025-01-20 RX ADMIN — FAMOTIDINE 40 MILLIGRAM(S): 10 INJECTION INTRAVENOUS at 06:33

## 2025-01-20 RX ADMIN — Medication 1 TABLET(S): at 11:18

## 2025-01-20 RX ADMIN — Medication 12.5 MILLIGRAM(S): at 17:35

## 2025-01-20 RX ADMIN — ENOXAPARIN SODIUM 40 MILLIGRAM(S): 100 INJECTION SUBCUTANEOUS at 17:35

## 2025-01-20 RX ADMIN — Medication 12.5 MILLIGRAM(S): at 06:33

## 2025-01-20 RX ADMIN — PANTOPRAZOLE 40 MILLIGRAM(S): 20 TABLET, DELAYED RELEASE ORAL at 06:33

## 2025-01-20 RX ADMIN — FAMOTIDINE 40 MILLIGRAM(S): 10 INJECTION INTRAVENOUS at 17:36

## 2025-01-20 RX ADMIN — POLYETHYLENE GLYCOL 3350 17 GRAM(S): 17 POWDER, FOR SOLUTION ORAL at 11:18

## 2025-01-20 RX ADMIN — Medication 1 ENEMA: at 11:18

## 2025-01-20 NOTE — PROGRESS NOTE ADULT - ASSESSMENT
54F no AC AP, hx stage IV breast ca. (bone/lung) HER2+ ER+ (dx: 2010, onc Yani Crum, sp lumpectomy/RT), LUE lymphedema, on daily chemo, p/f LBP a/w BLE numb, urinary retention, constipation. MR T w/ T9 path compfx w/ circumferential epidural extension w/ cord comp (L>R) worst at T8-9. Bilsky 3.  S/p T7-T11 Thoracic fusion with T8-T10 laminectomy and T9 B/L transpedicular decompression for resection of thoracic tumor 1/18/25. Post op doing well, s/p 1U PRBC transfusion 1/19     Plan    Neuro stable. Maintain HMV drain  Vitals stable- On Metoprolol 12.5mg q12   Constipation- Fleets enema. Bowel regimen   Onc- Outpatient f/u w Dr Dr Yani Crum  DVT ppx   anemia- post transfusion CBC stable  PT- Home PT/ RW

## 2025-01-20 NOTE — PROGRESS NOTE ADULT - SUBJECTIVE AND OBJECTIVE BOX
SUBJECTIVE:   Last BM  PTA. Voiding   OVERNIGHT EVENTS: none    Vital Signs Last 24 Hrs  T(C): 37.3 (2025 12:00), Max: 37.6 (2025 04:10)  T(F): 99.2 (2025 12:00), Max: 99.7 (2025 04:10)  HR: 80 (2025 12:00) (77 - 99)  BP: 102/68 (2025 12:00) (99/64 - 127/72)  BP(mean): 74 (2025 19:00) (74 - 74)  RR: 18 (2025 12:00) (16 - 22)  SpO2: 95% (2025 12:00) (94% - 99%)    Parameters below as of 2025 12:00  Patient On (Oxygen Delivery Method): room air        PHYSICAL EXAM:    Constitutional: No Acute Distress     Neurological: Awake alert Ox3, Speech clear  Following Commands, Moving all Extremities 5/5 Sensation intact . Upper back aquacel AG dressing C/D/I Left deep HMV 350cc/24 hrs R HMV scant output       Pulmonary: Clear to Auscultation    Cardiovascular: S1, S2, Regular rate and rhythm     Gastrointestinal: Soft, Non-tender, Non-distended     Extremities: No calf tenderness       LABS:                        7.9    6.59  )-----------( 184      ( 2025 10:21 )             24.7    01-20    138  |  104  |  10  ----------------------------<  145[H]  3.8   |  23  |  0.66    Ca    8.9      2025 10:21  Phos  2.7     01-20  Mg     2.2               IMAGIN/19 CT T/L spine Redemonstrated pathologic fracture of T9 with associated paravertebral epidural neoplasm about the lateral vertebral body and RIGHT ventral canal. Interval T9 laminectomy with resection of posterior epidural disease. Interval spinal fusion at T7-T11. Spinal fusion device in place. Small amount of postoperative air is seen without additional signs of acute complication.    MEDICATIONS:    ondansetron Injectable 4 milliGRAM(s) IV Push every 6 hours PRN Nausea and/or Vomiting  oxyCODONE    IR 10 milliGRAM(s) Oral every 4 hours PRN Severe Pain (7 - 10)  oxyCODONE    IR 5 milliGRAM(s) Oral every 4 hours PRN Moderate Pain (4 - 6)  metoprolol tartrate 12.5 milliGRAM(s) Oral every 12 hours  bisacodyl 5 milliGRAM(s) Oral every 12 hours PRN Constipation  famotidine    Tablet 40 milliGRAM(s) Oral two times a day  pantoprazole    Tablet 40 milliGRAM(s) Oral before breakfast  polyethylene glycol 3350 17 Gram(s) Oral daily  senna 2 Tablet(s) Oral at bedtime  enoxaparin Injectable 40 milliGRAM(s) SubCutaneous <User Schedule>  multivitamin 1 Tablet(s) Oral daily      DIET:

## 2025-01-21 LAB
ANION GAP SERPL CALC-SCNC: 13 MMOL/L — SIGNIFICANT CHANGE UP (ref 5–17)
BUN SERPL-MCNC: 10 MG/DL — SIGNIFICANT CHANGE UP (ref 7–23)
CALCIUM SERPL-MCNC: 9.2 MG/DL — SIGNIFICANT CHANGE UP (ref 8.4–10.5)
CHLORIDE SERPL-SCNC: 100 MMOL/L — SIGNIFICANT CHANGE UP (ref 96–108)
CO2 SERPL-SCNC: 24 MMOL/L — SIGNIFICANT CHANGE UP (ref 22–31)
CREAT SERPL-MCNC: 0.72 MG/DL — SIGNIFICANT CHANGE UP (ref 0.5–1.3)
EGFR: 101 ML/MIN/1.73M2 — SIGNIFICANT CHANGE UP
GLUCOSE SERPL-MCNC: 162 MG/DL — HIGH (ref 70–99)
HCT VFR BLD CALC: 26.1 % — LOW (ref 34.5–45)
HGB BLD-MCNC: 8.4 G/DL — LOW (ref 11.5–15.5)
MAGNESIUM SERPL-MCNC: 2.2 MG/DL — SIGNIFICANT CHANGE UP (ref 1.6–2.6)
MCHC RBC-ENTMCNC: 31 PG — SIGNIFICANT CHANGE UP (ref 27–34)
MCHC RBC-ENTMCNC: 32.2 G/DL — SIGNIFICANT CHANGE UP (ref 32–36)
MCV RBC AUTO: 96.3 FL — SIGNIFICANT CHANGE UP (ref 80–100)
NRBC # BLD: 3 /100 WBCS — HIGH (ref 0–0)
NRBC BLD-RTO: 3 /100 WBCS — HIGH (ref 0–0)
PHOSPHATE SERPL-MCNC: 2.8 MG/DL — SIGNIFICANT CHANGE UP (ref 2.5–4.5)
PLATELET # BLD AUTO: 214 K/UL — SIGNIFICANT CHANGE UP (ref 150–400)
POTASSIUM SERPL-MCNC: 3.9 MMOL/L — SIGNIFICANT CHANGE UP (ref 3.5–5.3)
POTASSIUM SERPL-SCNC: 3.9 MMOL/L — SIGNIFICANT CHANGE UP (ref 3.5–5.3)
RBC # BLD: 2.71 M/UL — LOW (ref 3.8–5.2)
RBC # FLD: 21.2 % — HIGH (ref 10.3–14.5)
SODIUM SERPL-SCNC: 137 MMOL/L — SIGNIFICANT CHANGE UP (ref 135–145)
WBC # BLD: 7.53 K/UL — SIGNIFICANT CHANGE UP (ref 3.8–10.5)
WBC # FLD AUTO: 7.53 K/UL — SIGNIFICANT CHANGE UP (ref 3.8–10.5)

## 2025-01-21 RX ORDER — TRAMADOL HYDROCHLORIDE 100 MG/1
50 TABLET, EXTENDED RELEASE ORAL EVERY 4 HOURS
Refills: 0 | Status: DISCONTINUED | OUTPATIENT
Start: 2025-01-21 | End: 2025-01-23

## 2025-01-21 RX ORDER — TRAMADOL HYDROCHLORIDE 100 MG/1
25 TABLET, EXTENDED RELEASE ORAL EVERY 4 HOURS
Refills: 0 | Status: DISCONTINUED | OUTPATIENT
Start: 2025-01-21 | End: 2025-01-23

## 2025-01-21 RX ADMIN — PANTOPRAZOLE 40 MILLIGRAM(S): 20 TABLET, DELAYED RELEASE ORAL at 06:21

## 2025-01-21 RX ADMIN — ENOXAPARIN SODIUM 40 MILLIGRAM(S): 100 INJECTION SUBCUTANEOUS at 17:46

## 2025-01-21 RX ADMIN — Medication 2 TABLET(S): at 04:36

## 2025-01-21 RX ADMIN — TRAMADOL HYDROCHLORIDE 50 MILLIGRAM(S): 100 TABLET, EXTENDED RELEASE ORAL at 12:10

## 2025-01-21 RX ADMIN — TRAMADOL HYDROCHLORIDE 50 MILLIGRAM(S): 100 TABLET, EXTENDED RELEASE ORAL at 11:32

## 2025-01-21 RX ADMIN — Medication 12.5 MILLIGRAM(S): at 17:47

## 2025-01-21 RX ADMIN — Medication 2 TABLET(S): at 22:06

## 2025-01-21 RX ADMIN — Medication 5 MILLIGRAM(S): at 17:47

## 2025-01-21 RX ADMIN — OXYCODONE HYDROCHLORIDE 10 MILLIGRAM(S): 30 TABLET ORAL at 02:28

## 2025-01-21 RX ADMIN — FAMOTIDINE 40 MILLIGRAM(S): 10 INJECTION INTRAVENOUS at 06:20

## 2025-01-21 RX ADMIN — FAMOTIDINE 40 MILLIGRAM(S): 10 INJECTION INTRAVENOUS at 17:47

## 2025-01-21 RX ADMIN — TRAMADOL HYDROCHLORIDE 50 MILLIGRAM(S): 100 TABLET, EXTENDED RELEASE ORAL at 22:06

## 2025-01-21 RX ADMIN — TRAMADOL HYDROCHLORIDE 50 MILLIGRAM(S): 100 TABLET, EXTENDED RELEASE ORAL at 23:06

## 2025-01-21 RX ADMIN — ONDANSETRON 4 MILLIGRAM(S): 4 TABLET, ORALLY DISINTEGRATING ORAL at 02:28

## 2025-01-21 RX ADMIN — POLYETHYLENE GLYCOL 3350 17 GRAM(S): 17 POWDER, FOR SOLUTION ORAL at 11:19

## 2025-01-21 RX ADMIN — Medication 1 TABLET(S): at 11:19

## 2025-01-21 NOTE — PROGRESS NOTE ADULT - SUBJECTIVE AND OBJECTIVE BOX
SUBJECTIVE: Patient seen and examined at bedside. States pain is well managed. Denies HA, vision changes, neck pain, SOB, cough, CP, abdominal pain, N/V, weakness or numbness/tingling    Vital Signs Last 24 Hrs  T(C): 36.8 (01-21-25 @ 16:06), Max: 37.5 (01-20-25 @ 22:03)  T(F): 98.3 (01-21-25 @ 16:06), Max: 99.5 (01-20-25 @ 22:03)  HR: 86 (01-21-25 @ 16:06) (86 - 97)  BP: 109/73 (01-21-25 @ 16:06) (97/63 - 131/75)  BP(mean): --  RR: 18 (01-21-25 @ 16:06) (18 - 18)  SpO2: 98% (01-21-25 @ 16:06) (93% - 98%)    PHYSICAL EXAM:    Constitutional: No Acute Distress     Neurological: AOX3, PERRL, EOMI, FC, SAM 5/5 SILT    Incision: c/d/i    Drains:   Right HMV (25cc)  Left HMV (275cc)    Pulmonary: Clear to Auscultation, No rales, No rhonchi, No wheezes     Cardiovascular: S1, S2, Regular rate and rhythm     Gastrointestinal: Soft, Non-tender, Non-distended, +bowel sounds x 4    Extremities: No calf tenderness bilaterally, no cyanosis, clubbing or edema          LABS:                          8.4    7.53  )-----------( 214      ( 21 Jan 2025 09:45 )             26.1    01-21    137  |  100  |  10  ----------------------------<  162[H]  3.9   |  24  |  0.72    Ca    9.2      21 Jan 2025 09:45  Phos  2.8     01-21  Mg     2.2     01-21 01-20 @ 07:01  -  01-21 @ 07:00  --------------------------------------------------------  IN: 1120 mL / OUT: 1100 mL / NET: 20 mL    01-21 @ 07:01  -  01-21 @ 19:01  --------------------------------------------------------  IN: 400 mL / OUT: 510 mL / NET: -110 mL        IMAGING:   < from: CT Lumbar Spine No Cont (01.19.25 @ 14:49) >  FINDINGS:    CT THORACIC SPINE:    Internal fusion hardware spans from T7 through T11. Diffuse osseous   metastases noted.    VERTEBRAE: Redemonstrated pathologic fracture of T9 with associated   paravertebral epidural neoplasm about the lateral vertebral body and   RIGHT ventral canal. Interval laminectomy with resection of posterior   epidural disease. The remaining vertebrae appear normal in height. No   acute fracture. Small multilevel marginal osteophytes are noted.  ALIGNMENT: No subluxation or scoliosis.  INTERVERTEBRAL DISC SPACES: Mild degenerative disc disease and   spondylosis in mid thoracic spine.  VISUALIZED LUNGS: Small bilateral pleural effusions. Scattered small   pulmonary nodules, better evaluated on CT chest from 1/17/2025.    MISCELLANEOUS:  Postoperative air is noted around the area of laminectomy   and fusion.      CT LUMBAR SPINE:    VERTEBRAE: Normal in height. No acute fracture. [No significant   osteophytosis.  ALIGNMENT: No subluxation or scoliosis.    DISCS: Mild to moderate degenerative disc disease disease with   spondylosis at L2-3 through L4-5 with disc bulges that flatten the   ventral thecal sac and narrow the BILATERAL neural foramina. Moderate   central stenosis at L2-3 and moderate to severe central stenosis at L3-4   and severe central stenosis at L4-5 on a degenerative basis due to disc   bulge, facet osteophytic hypertrophy and redundancy of ligamentum flavum.  MISCELLANEOUS:  None.      IMPRESSION:    CT THORACIC SPINE:  Redemonstrated pathologic fracture of T9 with associated paravertebral   epidural neoplasm about the lateral vertebral body and RIGHT ventral   canal. Interval T9 laminectomy with resection of posterior epidural   disease. Interval spinal fusion at T7-T11. Spinal fusion device in place.   Small amount of postoperative air is seen without additional signs of   acute complication.    CT LUMBAR SPINE:  No acute fracture or traumatic subluxation. Mild to moderate degenerative   disc disease disease with spondylosis at L2-3 through L4-5 with disc   bulges that flatten the ventral thecal sac and narrow the BILATERAL   neural foramina. Moderate central stenosis at L2-3 and moderate to severe   central stenosisat L3-4 and severe central stenosis at L4-5 on a   degenerative basis due to disc bulge, facet osteophytic hypertrophy and   redundancy of ligamentum flavum.    < end of copied text >    MEDICATIONS  (STANDING):  enoxaparin Injectable 40 milliGRAM(s) SubCutaneous <User Schedule>  famotidine    Tablet 40 milliGRAM(s) Oral two times a day  metoprolol tartrate 12.5 milliGRAM(s) Oral every 12 hours  multivitamin 1 Tablet(s) Oral daily  pantoprazole    Tablet 40 milliGRAM(s) Oral before breakfast  polyethylene glycol 3350 17 Gram(s) Oral daily  senna 2 Tablet(s) Oral at bedtime    MEDICATIONS  (PRN):  bisacodyl 5 milliGRAM(s) Oral every 12 hours PRN Constipation  ondansetron Injectable 4 milliGRAM(s) IV Push every 6 hours PRN Nausea and/or Vomiting  traMADol 25 milliGRAM(s) Oral every 4 hours PRN Moderate Pain (4 - 6)  traMADol 50 milliGRAM(s) Oral every 4 hours PRN Severe Pain (7 - 10)          DIET: regular

## 2025-01-21 NOTE — PROGRESS NOTE ADULT - ASSESSMENT
ASSESSMENT: HPI:  54F no AC AP, hx stage IV breast ca. (bone/lung) HER2+ ER+ (dx: 2010, onc Yani Crum, sp lumpectomy/RT), LUE lymphedema, on daily chemo, p/f LBP a/w BLE numb, urinary retention, constipation. MR T w/ T9 path compfx w/ circumferential epidural extension w/ cord comp (L>R) worst at T8-9. Bilsky 3. Also w/ anterior paraspinal soft tissue extension, T8-10. Exam: T9-10 sensory level. BUE 5/5, BLE 5/5, no clonus. Subj RLE heaviness.  (17 Jan 2025 03:48)      NEURO:  NC q 4hr   CT T/L spine post op completed and reviewed   Drains as per neurosurgery  pain control: tylenol, tramadol, robaxin  PT/OT: home pt w/ rolling walker    PULM:  on RA  satting 94  Incentive spirometry, mobilize as tolerated    CV:  -160  1/17 TTE: EF 65-70%  Continue home metoprolol 12.5 mg   EKG    RENAL:  IVL  Monitor ios  Replete PRN  voiding     GI:  Diet: Regular   GI prophylaxis [] not indicated [X] PPI [] other:  Bowel regimen [] colace [X] senna [X] other: miralax + dulcolax  Zofran for N/V  LBM: 1/20    ENDO:   Goal euglycemia (-180)    HEME/ONC:  H/H stable   VTE prophylaxis: [X] SCDs [X] chemoprophylaxis  Hx of Breast CA, follow up with oncologist outpatient  1/17 LED: neg     ID:  afebrile, nontoxic appearing     Plan was discussed with Dr. Rawls  [id number]

## 2025-01-21 NOTE — PROVIDER CONTACT NOTE (OTHER) - ASSESSMENT
Pt Hg on cbc yesterday was 7.9, of note pt's systolic BP running high 90s overnight, hr 90s. Metoprolol held by night RN this am

## 2025-01-22 RX ORDER — MAGNESIUM CITRATE
296 SOLUTION, ORAL ORAL ONCE
Refills: 0 | Status: COMPLETED | OUTPATIENT
Start: 2025-01-22 | End: 2025-01-22

## 2025-01-22 RX ORDER — BISACODYL 5 MG
10 TABLET, DELAYED RELEASE (ENTERIC COATED) ORAL
Refills: 0 | Status: COMPLETED | OUTPATIENT
Start: 2025-01-22 | End: 2025-01-22

## 2025-01-22 RX ORDER — POLYETHYLENE GLYCOL 3350 17 G/17G
17 POWDER, FOR SOLUTION ORAL
Refills: 0 | Status: DISCONTINUED | OUTPATIENT
Start: 2025-01-22 | End: 2025-01-23

## 2025-01-22 RX ORDER — METHOCARBAMOL 500 MG
500 TABLET ORAL EVERY 8 HOURS
Refills: 0 | Status: DISCONTINUED | OUTPATIENT
Start: 2025-01-22 | End: 2025-01-23

## 2025-01-22 RX ADMIN — TRAMADOL HYDROCHLORIDE 50 MILLIGRAM(S): 100 TABLET, EXTENDED RELEASE ORAL at 10:25

## 2025-01-22 RX ADMIN — Medication 12.5 MILLIGRAM(S): at 18:46

## 2025-01-22 RX ADMIN — Medication 296 MILLILITER(S): at 13:54

## 2025-01-22 RX ADMIN — FAMOTIDINE 40 MILLIGRAM(S): 10 INJECTION INTRAVENOUS at 06:12

## 2025-01-22 RX ADMIN — TRAMADOL HYDROCHLORIDE 50 MILLIGRAM(S): 100 TABLET, EXTENDED RELEASE ORAL at 14:30

## 2025-01-22 RX ADMIN — TRAMADOL HYDROCHLORIDE 50 MILLIGRAM(S): 100 TABLET, EXTENDED RELEASE ORAL at 22:55

## 2025-01-22 RX ADMIN — POLYETHYLENE GLYCOL 3350 17 GRAM(S): 17 POWDER, FOR SOLUTION ORAL at 18:46

## 2025-01-22 RX ADMIN — Medication 1 TABLET(S): at 10:52

## 2025-01-22 RX ADMIN — TRAMADOL HYDROCHLORIDE 50 MILLIGRAM(S): 100 TABLET, EXTENDED RELEASE ORAL at 21:55

## 2025-01-22 RX ADMIN — ENOXAPARIN SODIUM 40 MILLIGRAM(S): 100 INJECTION SUBCUTANEOUS at 18:47

## 2025-01-22 RX ADMIN — TRAMADOL HYDROCHLORIDE 50 MILLIGRAM(S): 100 TABLET, EXTENDED RELEASE ORAL at 09:42

## 2025-01-22 RX ADMIN — Medication 5 MILLIGRAM(S): at 10:53

## 2025-01-22 RX ADMIN — TRAMADOL HYDROCHLORIDE 50 MILLIGRAM(S): 100 TABLET, EXTENDED RELEASE ORAL at 13:54

## 2025-01-22 RX ADMIN — PANTOPRAZOLE 40 MILLIGRAM(S): 20 TABLET, DELAYED RELEASE ORAL at 06:12

## 2025-01-22 RX ADMIN — Medication 2 TABLET(S): at 21:51

## 2025-01-22 RX ADMIN — FAMOTIDINE 40 MILLIGRAM(S): 10 INJECTION INTRAVENOUS at 18:46

## 2025-01-22 NOTE — CHART NOTE - NSCHARTNOTESELECT_GEN_ALL_CORE
Heme/onc/Event Note
3 in 1 commode/Event Note
Removal Rt Axillary A-line/Event Note
VTE risk/Event Note

## 2025-01-22 NOTE — PROGRESS NOTE ADULT - ASSESSMENT
HPI:  54F no AC AP, hx stage IV breast ca. (bone/lung) HER2+ ER+ (dx: 2010, onc Yani Crum, cordelia lumpectomy/RT), LUE lymphedema, on daily chemo, p/f LBP a/w BLE numb, urinary retention, constipation. MR T w/ T9 path compfx w/ circumferential epidural extension w/ cord comp (L>R) worst at T8-9. Bilsky 3. Also w/ anterior paraspinal soft tissue extension, T8-10. Exam: T9-10 sensory level. BUE 5/5, BLE 5/5, no clonus. Subj RLE heaviness.  (17 Jan 2025 03:48)    PROCEDURE: Percutaneous insertion of arterial line    Fusion, spine, thoracic approach  s/p  T7-T11 Thoracic fusion with T8-T10 laminectomy and T9 B/L transpedicular Decompression for resection of thoracic tumor on 1/18      POD#  4  PLAN:  Neuro:   1 Out of bed   2 continue pt/ot   3 prn pan meds   4 Robaxin orn for muscle spasm   5 removed right drain.     Respiratory:   1 room air   2 incentive spirometry     CV:  1 Continue on metoprolol   2 blood pressure stable     Endocrine:   1 stable     Heme/Onc:             DVT ppx: sql and scds   1 PATH :P   2 h/o breast cancer -will need outpatient follow up with oncology     Renal:   1 voiding   2 iv lock     ID:   1 afebrile     GI:   1 regular diet   2 miralax, senna   3 added mag citrate and dulcolax suppository.     Social/Family:   Discharge planning: pending     -35 minutes spent for patient care and answered all questions.     -will discuss with Dr. Rawls   -ana [phone]

## 2025-01-22 NOTE — PROGRESS NOTE ADULT - SUBJECTIVE AND OBJECTIVE BOX
SUBJECTIVE:   Patient was seen and evaluated at bedside. Patient is resting in bed and is in no new acute distress.   OVERNIGHT EVENTS:   none   Vital Signs Last 24 Hrs  T(C): 37.4 (22 Jan 2025 09:10), Max: 37.7 (21 Jan 2025 20:19)  T(F): 99.4 (22 Jan 2025 09:10), Max: 99.8 (21 Jan 2025 20:19)  HR: 80 (22 Jan 2025 09:10) (79 - 98)  BP: 121/74 (22 Jan 2025 09:10) (95/63 - 121/78)  BP(mean): --  RR: 18 (22 Jan 2025 09:10) (18 - 18)  SpO2: 96% (22 Jan 2025 09:10) (94% - 98%)    Parameters below as of 22 Jan 2025 09:10  Patient On (Oxygen Delivery Method): room air        PHYSICAL EXAM:    General: No Acute Distress     Neurological: Awake, alert oriented to person, place and time, Following Commands, PERRL, EOMI, Face Symmetrical, Speech Fluent, Moving all extremities, Muscle Strength normal in all four extremities, No Drift, lle numbness persistent.     Pulmonary: Clear to Auscultation, No Rales, No Rhonchi, No Wheezes     Cardiovascular: S1, S2, Regular Rate and Rhythm     Gastrointestinal: Soft, Nontender, Nondistended     Incision: clean and dry.     LABS:                        8.4    7.53  )-----------( 214      ( 21 Jan 2025 09:45 )             26.1    01-21    137  |  100  |  10  ----------------------------<  162[H]  3.9   |  24  |  0.72    Ca    9.2      21 Jan 2025 09:45  Phos  2.8     01-21  Mg     2.2     01-21 01-21 @ 07:01  -  01-22 @ 07:00  --------------------------------------------------------  IN: 500 mL / OUT: 580 mL / NET: -80 mL    01-22 @ 07:01 - 01-22 @ 12:07  --------------------------------------------------------  IN: 240 mL / OUT: 0 mL / NET: 240 mL      DRAINS:   R HMV 0 CC , L  CC   MEDICATIONS:  Antibiotics:    Neuro:  ondansetron Injectable 4 milliGRAM(s) IV Push every 6 hours PRN Nausea and/or Vomiting  traMADol 25 milliGRAM(s) Oral every 4 hours PRN Moderate Pain (4 - 6)  traMADol 50 milliGRAM(s) Oral every 4 hours PRN Severe Pain (7 - 10)    Cardiac:  metoprolol tartrate 12.5 milliGRAM(s) Oral every 12 hours    Pulm:    GI/:  bisacodyl 5 milliGRAM(s) Oral every 12 hours PRN Constipation  famotidine    Tablet 40 milliGRAM(s) Oral two times a day  pantoprazole    Tablet 40 milliGRAM(s) Oral before breakfast  polyethylene glycol 3350 17 Gram(s) Oral two times a day  senna 2 Tablet(s) Oral at bedtime    Other:   enoxaparin Injectable 40 milliGRAM(s) SubCutaneous <User Schedule>  multivitamin 1 Tablet(s) Oral daily    DIET: [] Regular [] CCD [] Renal [] Puree [] Dysphagia [] Tube Feeds:   REGULAR   IMAGING:   ACC: [id number] EXAM:  CT THORACIC SPINE   ORDERED BY: REHAN RAMIREZ     ACC: [id number] EXAM:  CT LUMBAR SPINE   ORDERED BY: REHAN RAMIREZ     PROCEDURE DATE:  01/19/2025          INTERPRETATION:  CLINICAL INFORMATION: Epidural lesion. Evaluate postop.    COMPARISON: CT chest 1/17/2025. The lumbar spine MRi 1/16/2025.    CONTRAST:  IV Contrast: NONE.    TECHNIQUE:    CT SPINE: Axial images were obtained of the chest with reformatted images   through the spine using multislice helical technique. Reformatted coronal   and sagittal images were obtained.    FINDINGS:    CT THORACIC SPINE:    Internal fusion hardware spans from T7 through T11. Diffuse osseous   metastases noted.    VERTEBRAE: Redemonstrated pathologic fracture of T9 with associated   paravertebral epidural neoplasm about the lateral vertebral body and   RIGHT ventral canal. Interval laminectomy with resection of posterior   epidural disease. The remaining vertebrae appear normal in height. No   acute fracture. Small multilevel marginal osteophytes are noted.  ALIGNMENT: No subluxation or scoliosis.  INTERVERTEBRAL DISC SPACES: Mild degenerative disc disease and   spondylosis in mid thoracic spine.  VISUALIZED LUNGS: Small bilateral pleural effusions. Scattered small   pulmonary nodules, better evaluated on CT chest from 1/17/2025.    MISCELLANEOUS:  Postoperative air is noted around the area of laminectomy   and fusion.      CT LUMBAR SPINE:    VERTEBRAE: Normal in height. No acute fracture. [No significant   osteophytosis.  ALIGNMENT: No subluxation or scoliosis.    DISCS: Mild to moderate degenerative disc disease disease with   spondylosis at L2-3 through L4-5 with disc bulges that flatten the   ventral thecal sac and narrow the BILATERAL neural foramina. Moderate   central stenosis at L2-3 and moderate to severe central stenosis at L3-4   and severe central stenosis at L4-5 on a degenerative basis due to disc   bulge, facet osteophytic hypertrophy and redundancy of ligamentum flavum.  MISCELLANEOUS:  None.      IMPRESSION:    CT THORACIC SPINE:  Redemonstrated pathologic fracture of T9 with associated paravertebral   epidural neoplasm about the lateral vertebral body and RIGHT ventral   canal. Interval T9 laminectomy with resection of posterior epidural   disease. Interval spinal fusion at T7-T11. Spinal fusion device in place.   Small amount of postoperative air is seen without additional signs of   acute complication.    CT LUMBAR SPINE:  No acute fracture or traumatic subluxation. Mild to moderate degenerative   disc disease disease with spondylosis at L2-3 through L4-5 with disc   bulges that flatten the ventral thecal sac and narrow the BILATERAL   neural foramina. Moderate central stenosis at L2-3 and moderate to severe   central stenosisat L3-4 and severe central stenosis at L4-5 on a   degenerative basis due to disc bulge, facet osteophytic hypertrophy and   redundancy of ligamentum flavum.            --- End of Report ---          ROMAN LEUNG MD; Resident Radiologist  This document has been electronically signed.  CYNTHIA MO MD; Attending Radiologist  This document has been electronically signed. Jan 20 2025 10:42AM

## 2025-01-22 NOTE — PROGRESS NOTE ADULT - REASON FOR ADMISSION
Spinal tumor with cord compression
T9 pathologic fx, T8-10 epidural tumor
Patient was admitted low back pain associated with urinary retention and constipation. Patient was noted to have thoracic spine mets casing spinal coed compression and pathological spine fracture at T9.
T9 pathologic fx, T8-10 epidural tumor

## 2025-01-22 NOTE — CHART NOTE - NSCHARTNOTEFT_GEN_A_CORE
3 in 1 commode :the patient is confined to 1 level of the home environment and there is no toilet on that level therefore requiring a 3 in 1 commode.
CAPRINI SCORE [CLOT] Score on Admission for     AGE RELATED RISK FACTORS                                                       MOBILITY RELATED FACTORS  [x] Age 41-60 years                                            (1 Point)                  [ ] Bed rest                                                        (1 Point)  [ ] Age: 61-74 years                                           (2 Points)                 [ ] Plaster cast                                                   (2 Points)  [ ] Age= 75 years                                              (3 Points)                 [ ] Bed bound for more than 72 hours                 (2 Points)    DISEASE RELATED RISK FACTORS                                               GENDER SPECIFIC FACTORS  [ ] Edema in the lower extremities                       (1 Point)                  [ ] Pregnancy                                                     (1 Point)  [ ] Varicose veins                                               (1 Point)                  [ ] Post-partum < 6 weeks                                   (1 Point)             [ ] BMI > 25 Kg/m2                                            (1 Point)                  [ ] Hormonal therapy  or oral contraception          (1 Point)                 [ ] Sepsis (in the previous month)                        (1 Point)                  [ ] History of pregnancy complications                 (1 point)  [ ] Pneumonia or serious lung disease                                               [ ] Unexplained or recurrent                     (1 Point)           (in the previous month)                               (1 Point)  [ ] Abnormal pulmonary function test                     (1 Point)                 SURGERY RELATED RISK FACTORS (include planned surgeries)  [ ] Acute myocardial infarction                              (1 Point)                 [ ]  Section                                             (1 Point)  [ ] Congestive heart failure (in the previous month)  (1 Point)         [ ] Minor surgery                                                  (1 Point)   [ ] Inflammatory bowel disease                             (1 Point)                 [ ] Arthroscopic surgery                                        (2 Points)  [ ] Central venous access                                      (2 Points)                [ ] General surgery lasting more than 45 minutes   (2 Points)       [ ] Stroke (in the previous month)                          (5 Points)               [ ] Elective arthroplasty                                         (5 Points)            [x] current or past malignancy                              (2 Points)                                                                                                       HEMATOLOGY RELATED FACTORS                                                 TRAUMA RELATED RISK FACTORS  [ ] Prior episodes of VTE                                     (3 Points)                [ ] Fracture of the hip, pelvis, or leg                       (5 Points)  [ ] Positive family history for VTE                         (3 Points)                 [x] Acute spinal cord injury (in the previous month)  (5 Points)  [ ] Prothrombin 03081 A                                     (3 Points)                 [ ] Paralysis  (less than 1 month)                             (5 Points)  [ ] Factor V Leiden                                             (3 Points)                  [ ] Multiple Trauma within 1 month                        (5 Points)  [ ] Lupus anticoagulants                                     (3 Points)                                                           [ ] Anticardiolipin antibodies                               (3 Points)                                                       [ ] High homocysteine in the blood                      (3 Points)                                             [ ] Other congenital or acquired thrombophilia      (3 Points)                                                [ ] Heparin induced thrombocytopenia                  (3 Points)                                          Total Score [      8  ]    Risk:  Very low 0   Low 1 to 2   Moderate 3 to 4   High =5       VTE Prophylasix Recommednations:  [x] mechanical pneumatic compression devices                                      [ ] contraindicated: _____________________  [ ] chemo prophylasix                                                                                   [ ] contraindicated _____________________    **** HIGH LIKELIHOOD DVT PRESENT ON ADMISSION  [ ] (please order LE dopplers within 24 hours of admission)
Rt axillary arterial line dressing removed and area cleaned with chlorhexidine. Suture removed and arterial line removed without complications. Pressure held for hemostasis for 10 minutes. No active bleeding noted at site. Gauze and tegarderm placed over site of removal. Pt educated to monitor and notify a provider if there is any swelling or pain.  All questions answered.
Spoke to patient's private oncologist, Dr Yani Crum through TEAMS.   Per discussion, all her chemo medications will be changed outpatient, she will follow-up with her once patient gets discharged.

## 2025-01-23 ENCOUNTER — TRANSCRIPTION ENCOUNTER (OUTPATIENT)
Age: 55
End: 2025-01-23

## 2025-01-23 VITALS — HEART RATE: 79 BPM | DIASTOLIC BLOOD PRESSURE: 79 MMHG | SYSTOLIC BLOOD PRESSURE: 113 MMHG

## 2025-01-23 LAB
ANION GAP SERPL CALC-SCNC: 11 MMOL/L — SIGNIFICANT CHANGE UP (ref 5–17)
BLD GP AB SCN SERPL QL: NEGATIVE — SIGNIFICANT CHANGE UP
BUN SERPL-MCNC: 12 MG/DL — SIGNIFICANT CHANGE UP (ref 7–23)
CALCIUM SERPL-MCNC: 9 MG/DL — SIGNIFICANT CHANGE UP (ref 8.4–10.5)
CHLORIDE SERPL-SCNC: 99 MMOL/L — SIGNIFICANT CHANGE UP (ref 96–108)
CO2 SERPL-SCNC: 28 MMOL/L — SIGNIFICANT CHANGE UP (ref 22–31)
CREAT SERPL-MCNC: 0.61 MG/DL — SIGNIFICANT CHANGE UP (ref 0.5–1.3)
EGFR: 108 ML/MIN/1.73M2 — SIGNIFICANT CHANGE UP
GLUCOSE SERPL-MCNC: 111 MG/DL — HIGH (ref 70–99)
HCT VFR BLD CALC: 22.5 % — LOW (ref 34.5–45)
HCT VFR BLD CALC: 23.6 % — LOW (ref 34.5–45)
HGB BLD-MCNC: 7.1 G/DL — LOW (ref 11.5–15.5)
HGB BLD-MCNC: 7.5 G/DL — LOW (ref 11.5–15.5)
MCHC RBC-ENTMCNC: 30.1 PG — SIGNIFICANT CHANGE UP (ref 27–34)
MCHC RBC-ENTMCNC: 30.6 PG — SIGNIFICANT CHANGE UP (ref 27–34)
MCHC RBC-ENTMCNC: 31.6 G/DL — LOW (ref 32–36)
MCHC RBC-ENTMCNC: 31.8 G/DL — LOW (ref 32–36)
MCV RBC AUTO: 95.3 FL — SIGNIFICANT CHANGE UP (ref 80–100)
MCV RBC AUTO: 96.3 FL — SIGNIFICANT CHANGE UP (ref 80–100)
NRBC # BLD: 2 /100 WBCS — HIGH (ref 0–0)
NRBC # BLD: 3 /100 WBCS — HIGH (ref 0–0)
NRBC BLD-RTO: 2 /100 WBCS — HIGH (ref 0–0)
NRBC BLD-RTO: 3 /100 WBCS — HIGH (ref 0–0)
PLATELET # BLD AUTO: 231 K/UL — SIGNIFICANT CHANGE UP (ref 150–400)
PLATELET # BLD AUTO: 254 K/UL — SIGNIFICANT CHANGE UP (ref 150–400)
POTASSIUM SERPL-MCNC: 4.4 MMOL/L — SIGNIFICANT CHANGE UP (ref 3.5–5.3)
POTASSIUM SERPL-SCNC: 4.4 MMOL/L — SIGNIFICANT CHANGE UP (ref 3.5–5.3)
RBC # BLD: 2.36 M/UL — LOW (ref 3.8–5.2)
RBC # BLD: 2.45 M/UL — LOW (ref 3.8–5.2)
RBC # FLD: 19.9 % — HIGH (ref 10.3–14.5)
RBC # FLD: 20 % — HIGH (ref 10.3–14.5)
RH IG SCN BLD-IMP: POSITIVE — SIGNIFICANT CHANGE UP
SODIUM SERPL-SCNC: 138 MMOL/L — SIGNIFICANT CHANGE UP (ref 135–145)
WBC # BLD: 6.16 K/UL — SIGNIFICANT CHANGE UP (ref 3.8–10.5)
WBC # BLD: 7.03 K/UL — SIGNIFICANT CHANGE UP (ref 3.8–10.5)
WBC # FLD AUTO: 6.16 K/UL — SIGNIFICANT CHANGE UP (ref 3.8–10.5)
WBC # FLD AUTO: 7.03 K/UL — SIGNIFICANT CHANGE UP (ref 3.8–10.5)

## 2025-01-23 PROCEDURE — 82947 ASSAY GLUCOSE BLOOD QUANT: CPT

## 2025-01-23 PROCEDURE — 85025 COMPLETE CBC W/AUTO DIFF WBC: CPT

## 2025-01-23 PROCEDURE — 87641 MR-STAPH DNA AMP PROBE: CPT

## 2025-01-23 PROCEDURE — 82803 BLOOD GASES ANY COMBINATION: CPT

## 2025-01-23 PROCEDURE — 97110 THERAPEUTIC EXERCISES: CPT

## 2025-01-23 PROCEDURE — 87640 STAPH A DNA AMP PROBE: CPT

## 2025-01-23 PROCEDURE — 82330 ASSAY OF CALCIUM: CPT

## 2025-01-23 PROCEDURE — 85014 HEMATOCRIT: CPT

## 2025-01-23 PROCEDURE — 84295 ASSAY OF SERUM SODIUM: CPT

## 2025-01-23 PROCEDURE — 74177 CT ABD & PELVIS W/CONTRAST: CPT | Mod: MC

## 2025-01-23 PROCEDURE — 80048 BASIC METABOLIC PNL TOTAL CA: CPT

## 2025-01-23 PROCEDURE — 99285 EMERGENCY DEPT VISIT HI MDM: CPT | Mod: 25

## 2025-01-23 PROCEDURE — 36430 TRANSFUSION BLD/BLD COMPNT: CPT

## 2025-01-23 PROCEDURE — C8929: CPT

## 2025-01-23 PROCEDURE — C1713: CPT

## 2025-01-23 PROCEDURE — 88307 TISSUE EXAM BY PATHOLOGIST: CPT

## 2025-01-23 PROCEDURE — 72128 CT CHEST SPINE W/O DYE: CPT | Mod: MC

## 2025-01-23 PROCEDURE — 93970 EXTREMITY STUDY: CPT

## 2025-01-23 PROCEDURE — 86901 BLOOD TYPING SEROLOGIC RH(D): CPT

## 2025-01-23 PROCEDURE — 85730 THROMBOPLASTIN TIME PARTIAL: CPT

## 2025-01-23 PROCEDURE — 88342 IMHCHEM/IMCYTCHM 1ST ANTB: CPT

## 2025-01-23 PROCEDURE — 86900 BLOOD TYPING SEROLOGIC ABO: CPT

## 2025-01-23 PROCEDURE — 83605 ASSAY OF LACTIC ACID: CPT

## 2025-01-23 PROCEDURE — C1889: CPT

## 2025-01-23 PROCEDURE — 97166 OT EVAL MOD COMPLEX 45 MIN: CPT

## 2025-01-23 PROCEDURE — 86923 COMPATIBILITY TEST ELECTRIC: CPT

## 2025-01-23 PROCEDURE — 96374 THER/PROPH/DIAG INJ IV PUSH: CPT

## 2025-01-23 PROCEDURE — 88341 IMHCHEM/IMCYTCHM EA ADD ANTB: CPT

## 2025-01-23 PROCEDURE — 83735 ASSAY OF MAGNESIUM: CPT

## 2025-01-23 PROCEDURE — 86850 RBC ANTIBODY SCREEN: CPT

## 2025-01-23 PROCEDURE — 84702 CHORIONIC GONADOTROPIN TEST: CPT

## 2025-01-23 PROCEDURE — 97116 GAIT TRAINING THERAPY: CPT

## 2025-01-23 PROCEDURE — 71260 CT THORAX DX C+: CPT | Mod: MC

## 2025-01-23 PROCEDURE — P9016: CPT

## 2025-01-23 PROCEDURE — 88360 TUMOR IMMUNOHISTOCHEM/MANUAL: CPT

## 2025-01-23 PROCEDURE — 84100 ASSAY OF PHOSPHORUS: CPT

## 2025-01-23 PROCEDURE — 76000 FLUOROSCOPY <1 HR PHYS/QHP: CPT

## 2025-01-23 PROCEDURE — C1769: CPT

## 2025-01-23 PROCEDURE — 88377 M/PHMTRC ALYS ISHQUANT/SEMIQ: CPT

## 2025-01-23 PROCEDURE — 97530 THERAPEUTIC ACTIVITIES: CPT

## 2025-01-23 PROCEDURE — 82435 ASSAY OF BLOOD CHLORIDE: CPT

## 2025-01-23 PROCEDURE — 84132 ASSAY OF SERUM POTASSIUM: CPT

## 2025-01-23 PROCEDURE — 36415 COLL VENOUS BLD VENIPUNCTURE: CPT

## 2025-01-23 PROCEDURE — 80053 COMPREHEN METABOLIC PANEL: CPT

## 2025-01-23 PROCEDURE — 97161 PT EVAL LOW COMPLEX 20 MIN: CPT

## 2025-01-23 PROCEDURE — 85027 COMPLETE CBC AUTOMATED: CPT

## 2025-01-23 PROCEDURE — 72131 CT LUMBAR SPINE W/O DYE: CPT | Mod: MC

## 2025-01-23 PROCEDURE — 85610 PROTHROMBIN TIME: CPT

## 2025-01-23 PROCEDURE — 85018 HEMOGLOBIN: CPT

## 2025-01-23 RX ORDER — TRAMADOL HYDROCHLORIDE 100 MG/1
1 TABLET, EXTENDED RELEASE ORAL
Qty: 40 | Refills: 0
Start: 2025-01-23 | End: 2025-02-01

## 2025-01-23 RX ORDER — SENNOSIDES 8.6 MG
2 TABLET ORAL
Qty: 60 | Refills: 0
Start: 2025-01-23 | End: 2025-02-21

## 2025-01-23 RX ORDER — LAPATINIB 250 MG/1
6 TABLET ORAL
Refills: 0 | DISCHARGE

## 2025-01-23 RX ORDER — POLYETHYLENE GLYCOL 3350 17 G/17G
17 POWDER, FOR SOLUTION ORAL
Qty: 340 | Refills: 0
Start: 2025-01-23 | End: 2025-02-01

## 2025-01-23 RX ORDER — METHOCARBAMOL 500 MG
1 TABLET ORAL
Qty: 30 | Refills: 0
Start: 2025-01-23 | End: 2025-02-01

## 2025-01-23 RX ORDER — CAPECITABINE 150 MG/1
3 TABLET, FILM COATED ORAL
Refills: 0 | DISCHARGE

## 2025-01-23 RX ADMIN — FAMOTIDINE 40 MILLIGRAM(S): 10 INJECTION INTRAVENOUS at 05:33

## 2025-01-23 RX ADMIN — ENOXAPARIN SODIUM 40 MILLIGRAM(S): 100 INJECTION SUBCUTANEOUS at 18:05

## 2025-01-23 RX ADMIN — Medication 12.5 MILLIGRAM(S): at 18:04

## 2025-01-23 RX ADMIN — PANTOPRAZOLE 40 MILLIGRAM(S): 20 TABLET, DELAYED RELEASE ORAL at 05:33

## 2025-01-23 RX ADMIN — FAMOTIDINE 40 MILLIGRAM(S): 10 INJECTION INTRAVENOUS at 18:04

## 2025-01-23 RX ADMIN — Medication 12.5 MILLIGRAM(S): at 05:33

## 2025-01-23 RX ADMIN — Medication 1 TABLET(S): at 12:07

## 2025-01-23 NOTE — DISCHARGE NOTE NURSING/CASE MANAGEMENT/SOCIAL WORK - NSDCPETBCESMAN_GEN_ALL_CORE
-- DO NOT REPLY / DO NOT REPLY ALL --  -- Message is from the [facility]--    PATIENT WANTS TO SET UP PROXY FOR THEIR CHILD IN [place]    Team Member confirmed that parent has a [employer] Dani    Names &  of children with a Virtual Visit, a COVID-19 Vaccine Appointment, need for COVID-19 Test Results or Proof of Vaccination, or on a Wait List:     Katie Snyderchesi  2016    Alternate Call Back #: [phone]    Confirm that parent has or was sent the activation info for their own account before routing a message to the Registration Team.    From the parent's chart, route to the [facility] [employer] Proxy Request Pool: [facility] [employer] PR[employer] REQUEST POOL [[id number]]    \"I will route this to our Registration team, and they will process your request and give you a call back.\"    
If you are a smoker, it is important for your health to stop smoking. Please be aware that second hand smoke is also harmful.

## 2025-01-23 NOTE — DISCHARGE NOTE PROVIDER - CARE PROVIDER_API CALL
Alex Rawls  Neurosurgery  [address]  [place] [address]  Phone: [phone]  Fax: [phone]  Follow Up Time:     Yani Crum  Medical Oncology  [address]  [place] [address]  Phone: [phone]  Fax: [phone]  Follow Up Time:

## 2025-01-23 NOTE — DISCHARGE NOTE PROVIDER - NSDCMRMEDTOKEN_GEN_ALL_CORE_FT
famotidine 40 mg oral tablet: 1 tab(s) orally 2 times a day  methocarbamol 500 mg oral tablet: 1 tab(s) orally every 8 hours as needed for muscle spasm/pain  metoprolol succinate 25 mg oral tablet, extended release: 1 tab(s) orally once a day  omeprazole 20 mg oral delayed release tablet: 1 tab(s) orally once a day in AM on empty stomach  polyethylene glycol 3350 oral powder for reconstitution: 17 gram(s) orally 2 times a day  senna leaf extract oral tablet: 2 tab(s) orally once a day (at bedtime)  traMADol 50 mg oral tablet: 1 tab(s) orally every 6 hours as needed for Severe Pain (7 - 10) MDD: 4

## 2025-01-23 NOTE — DISCHARGE NOTE PROVIDER - HOSPITAL COURSE
54F no AC AP, hx stage IV breast ca. (bone/lung) HER2+ ER+ (dx: 2010, onc Yani Crum, cordelia lumpectomy/RT), LUE lymphedema, on daily chemo, p/f LBP a/w BLE numb, urinary retention, constipation. MR T w/ epidural tumor T7-T10 T8-T9 epidural tumor encases thecal sac resulting  resulting in moderate to severe spinal canal stenosis with mild cord compression and suspicion for mild central cord edema. T9 pathologic  compression fracture / Enhancing paraspinous soft tissue mass at the T8, T9 and upper T10 levels bilaterally.   LE duplex negative for DVT 1/17/24. CT C/A/P Extensive bony metastatic disease, notably in the spine and the pelvis. Pathologic compression fracture of the the T9 vertebral body with surrounding soft tissue mass measuring 6.8 x 4.2 cm. No evidence of retropulsion. Innumerable bilateral pulmonary nodules/ submucosal fibroid.   S/p T7-T11 Thoracic fusion with T8-T10 laminectomy and T9 B/L transpedicular decompression for resection of thoracic tumor 1/18/25. Post op anemia s/p 1U PRBC transfusion 1/19 1/19 CT T/L spine Redemonstrated pathologic fracture of T9 with associated paravertebral epidural neoplasm about the lateral vertebral body and RIGHT ventral canal. Interval T9 laminectomy with resection of posterior epidural disease. Interval spinal fusion at T7-T11. Spinal fusion device in place. Pathology results pending . CBC stable . Course  significant for constipation Maintained Aggressive bowel regimen   Evaluated by PT &b cleared for Home PT. Surgical drain removed 1/23/25.  Discharged home in stable condition

## 2025-01-23 NOTE — DISCHARGE NOTE PROVIDER - CARE PROVIDERS DIRECT ADDRESSES
[email or web address]

## 2025-01-23 NOTE — PROGRESS NOTE ADULT - PROVIDER SPECIALTY LIST ADULT
NSICU
NSICU
Neurosurgery
Neurosurgery
NSICU
NSICU
Neurosurgery
Neurosurgery
NSICU
Neurosurgery
NSICU

## 2025-01-23 NOTE — PROGRESS NOTE ADULT - ASSESSMENT
54F no AC AP, hx stage IV breast ca. (bone/lung) HER2+ ER+ (dx: 2010, onc Yani Crum, cordelia lumpectomy/RT), LUE lymphedema, on daily chemo, p/f LBP a/w BLE numb, urinary retention, constipation. MR T w/ epidural tumor T7-T10 T8-T9 epidural tumor encases thecal sac resulting  resulting in moderate to severe spinal canal stenosis with mild cord compression and suspicion for mild central cord edema. T9 pathologic  compression fracture / Enhancing paraspinous soft tissue mass at the T8, T9 and upper T10 levels bilaterally.   LE duplex negative for DVT 1/17/24. CT C/A/P Extensive bony metastatic disease, notably in the spine and the pelvis. Pathologic compression fracture of the the T9 vertebral body with surrounding soft tissue mass measuring 6.8 x 4.2 cm. No evidence of retropulsion. Innumerable bilateral pulmonary nodules/ submucosal fibroid.   S/p T7-T11 Thoracic fusion with T8-T10 laminectomy and T9 B/L transpedicular decompression for resection of thoracic tumor 1/18/25. Post op anemia s/p 1U PRBC transfusion 1/19 1/19 CT T/L spine Redemonstrated pathologic fracture of T9 with associated paravertebral epidural neoplasm about the lateral vertebral body and RIGHT ventral canal. Interval T9 laminectomy with resection of posterior epidural disease. Interval spinal fusion at T7-T11. Spinal fusion device in place.    Plan     Neuro stable Left HMV drain  54F no AC AP, hx stage IV breast ca. (bone/lung) HER2+ ER+ (dx: 2010, onc Yani Crum, cordelia lumpectomy/RT), LUE lymphedema, on daily chemo, p/f LBP a/w BLE numb, urinary retention, constipation. MR T w/ epidural tumor T7-T10 T8-T9 epidural tumor encases thecal sac resulting  resulting in moderate to severe spinal canal stenosis with mild cord compression and suspicion for mild central cord edema. T9 pathologic  compression fracture / Enhancing paraspinous soft tissue mass at the T8, T9 and upper T10 levels bilaterally.   LE duplex negative for DVT 1/17/24. CT C/A/P Extensive bony metastatic disease, notably in the spine and the pelvis. Pathologic compression fracture of the the T9 vertebral body with surrounding soft tissue mass measuring 6.8 x 4.2 cm. No evidence of retropulsion. Innumerable bilateral pulmonary nodules/ submucosal fibroid.   S/p T7-T11 Thoracic fusion with T8-T10 laminectomy and T9 B/L transpedicular decompression for resection of thoracic tumor 1/18/25. Post op anemia s/p 1U PRBC transfusion 1/19 1/19 CT T/L spine Redemonstrated pathologic fracture of T9 with associated paravertebral epidural neoplasm about the lateral vertebral body and RIGHT ventral canal. Interval T9 laminectomy with resection of posterior epidural disease. Interval spinal fusion at T7-T11. Spinal fusion device in place. Pathology pending     Plan     Neuro stable Left HMV drain removed wo difficulty. Site C/D  Vitals stable. On Metoprolol 12.5mg q12.  Oncology- Dr Yani Crum aware of current admission & surgery & in touch with patient to arrange Radiation to thoracic spine & further treatments . Will d/w Dr Crum regarding home breast cancer adjuvant treatments.    Anemia -  Rpt CBC prior to discharge   DVT/ GI ppx   PT- Home PT  PT Home PT. Possible d/c home later today if CBC stable

## 2025-01-23 NOTE — DISCHARGE NOTE NURSING/CASE MANAGEMENT/SOCIAL WORK - FINANCIAL ASSISTANCE
[facility] provides services at a reduced cost to those who are determined to be eligible through [facility]’s financial assistance program. Information regarding [facility]’s financial assistance program can be found by going to [email or web address] or by calling [phone].

## 2025-01-23 NOTE — PROGRESS NOTE ADULT - SUBJECTIVE AND OBJECTIVE BOX
SUBJECTIVE:   Doing well. Ambulated w PT. No dizziness.   OVERNIGHT EVENTS: none    Vital Signs Last 24 Hrs  T(C): 36.7 (23 Jan 2025 08:46), Max: 37.2 (22 Jan 2025 16:37)  T(F): 98 (23 Jan 2025 08:46), Max: 99 (22 Jan 2025 16:37)  HR: 96 (23 Jan 2025 08:46) (77 - 96)  BP: 107/64 (23 Jan 2025 08:46) (101/67 - 114/76)  BP(mean): --  RR: 18 (23 Jan 2025 08:46) (17 - 18)  SpO2: 97% (23 Jan 2025 08:46) (94% - 100%)    Parameters below as of 23 Jan 2025 08:46  Patient On (Oxygen Delivery Method): room air        PHYSICAL EXAM:    Neurological: Awake alert Ox3, Speech clear  Following Commands, Moving all Extremities 5/5 Sensation intact . Upper back aquacel AG dressing C/D/I Left deep HMV 35cc/24 hr      Pulmonary: Clear to Auscultation    Cardiovascular: S1, S2, Regular rate and rhythm     Gastrointestinal: Soft, Non-tender, Non-distended     Extremities: No calf tenderness     LABS:                        7.1    6.16  )-----------( 231      ( 23 Jan 2025 06:23 )             22.5    01-23    138  |  99  |  12  ----------------------------<  111[H]  4.4   |  28  |  0.61    Ca    9.0      23 Jan 2025 06:23        01-22 @ 07:01  -  01-23 @ 07:00  --------------------------------------------------------  IN: 930 mL / OUT: 440 mL / NET: 490 mL      IMAGING:         MEDICATIONS:    methocarbamol 500 milliGRAM(s) Oral every 8 hours PRN muscle spasm/pain  ondansetron Injectable 4 milliGRAM(s) IV Push every 6 hours PRN Nausea and/or Vomiting  traMADol 25 milliGRAM(s) Oral every 4 hours PRN Moderate Pain (4 - 6)  traMADol 50 milliGRAM(s) Oral every 4 hours PRN Severe Pain (7 - 10)  metoprolol tartrate 12.5 milliGRAM(s) Oral every 12 hours  bisacodyl 5 milliGRAM(s) Oral every 12 hours PRN Constipation  famotidine    Tablet 40 milliGRAM(s) Oral two times a day  pantoprazole    Tablet 40 milliGRAM(s) Oral before breakfast  polyethylene glycol 3350 17 Gram(s) Oral two times a day  senna 2 Tablet(s) Oral at bedtime    Other:   enoxaparin Injectable 40 milliGRAM(s) SubCutaneous <User Schedule>  multivitamin 1 Tablet(s) Oral daily      DIET:

## 2025-01-23 NOTE — DISCHARGE NOTE PROVIDER - NSDCCPCAREPLAN_GEN_ALL_CORE_FT
PRINCIPAL DISCHARGE DIAGNOSIS  Diagnosis: Spinal cord compression  Assessment and Plan of Treatment: Pathologic fracture of T9 with severe canal stenosis/ mild cord compression associated T7- T10 epidural neoplasm  S/p T7-T11 Thoracic fusion with T8-T10 laminectomy and T9 B/L transpedicular decompression for resection of metastatic  thoracic tumor 1/18/25.  Incision evaluation and suture removal at Dr Rawls office in 1 week   No strenous activity. No heavy lifting. Do not lift hand above shoulders. Do not return to work until cleared by physician. No driving until cleared by physician.  Keep Incision Clean and Dry. May shower 1/24/23 .  pat dry after. no creams or lotions on incision. No immersion baths..        SECONDARY DISCHARGE DIAGNOSES  Diagnosis: Carcinoma of breast metastatic to bone  Assessment and Plan of Treatment: Follow up with Dr Yang Crum in 1-2 weeks  Follow up pathology results  Needs radiation to thoracic spine  Further chemotherapy plans as per Dr Crum    Diagnosis: Anemia of chronic disease  Assessment and Plan of Treatment: & post operative anemia   CBC overall stable . Iron supplement    Diagnosis: Constipation  Assessment and Plan of Treatment: Maintain bowel regimen

## 2025-01-23 NOTE — DISCHARGE NOTE NURSING/CASE MANAGEMENT/SOCIAL WORK - PATIENT PORTAL LINK FT
You can access the FollowMyHealth Patient Portal offered by [facility] by registering at the following website: [email or web address]. By joining [employer]’s FollowMyHealth portal, you will also be able to view your health information using other applications (apps) compatible with our system.

## 2025-01-28 LAB — SURGICAL PATHOLOGY STUDY: SIGNIFICANT CHANGE UP

## 2025-02-18 ENCOUNTER — APPOINTMENT (OUTPATIENT)
Dept: NEUROSURGERY | Facility: CLINIC | Age: 55
End: 2025-02-18
Payer: COMMERCIAL

## 2025-02-18 VITALS
BODY MASS INDEX: 41.14 KG/M2 | SYSTOLIC BLOOD PRESSURE: 108 MMHG | WEIGHT: 256 LBS | DIASTOLIC BLOOD PRESSURE: 70 MMHG | OXYGEN SATURATION: 100 % | TEMPERATURE: 97.9 F | HEART RATE: 73 BPM | HEIGHT: 66 IN

## 2025-02-18 DIAGNOSIS — Z98.1 ARTHRODESIS STATUS: ICD-10-CM

## 2025-02-18 PROCEDURE — 99024 POSTOP FOLLOW-UP VISIT: CPT

## 2025-02-18 RX ORDER — SENNOSIDES 8.6 MG TABLETS 8.6 MG/1
8.6 TABLET ORAL
Qty: 1 | Refills: 0 | Status: ACTIVE | COMMUNITY
Start: 2025-02-18 | End: 1900-01-01

## 2025-02-18 RX ORDER — TRAMADOL HYDROCHLORIDE 50 MG/1
50 TABLET, COATED ORAL EVERY 8 HOURS
Qty: 21 | Refills: 0 | Status: ACTIVE | COMMUNITY
Start: 2025-02-18 | End: 1900-01-01

## 2025-02-18 RX ORDER — METHOCARBAMOL 750 MG/1
750 TABLET, FILM COATED ORAL 3 TIMES DAILY
Qty: 90 | Refills: 3 | Status: ACTIVE | COMMUNITY
Start: 2025-02-18 | End: 2025-06-18

## 2025-02-19 ENCOUNTER — APPOINTMENT (OUTPATIENT)
Dept: RADIOLOGY | Facility: CLINIC | Age: 55
End: 2025-02-19
Payer: COMMERCIAL

## 2025-02-19 PROCEDURE — 72070 X-RAY EXAM THORAC SPINE 2VWS: CPT

## 2025-03-05 ENCOUNTER — NON-APPOINTMENT (OUTPATIENT)
Age: 55
End: 2025-03-05

## 2025-03-05 ENCOUNTER — APPOINTMENT (OUTPATIENT)
Dept: RADIATION ONCOLOGY | Facility: CLINIC | Age: 55
End: 2025-03-05
Payer: COMMERCIAL

## 2025-03-05 VITALS
RESPIRATION RATE: 16 BRPM | HEART RATE: 73 BPM | DIASTOLIC BLOOD PRESSURE: 70 MMHG | HEIGHT: 66 IN | TEMPERATURE: 98.5 F | OXYGEN SATURATION: 100 % | WEIGHT: 250 LBS | SYSTOLIC BLOOD PRESSURE: 115 MMHG | BODY MASS INDEX: 40.18 KG/M2

## 2025-03-05 PROCEDURE — 99205 OFFICE O/P NEW HI 60 MIN: CPT

## 2025-03-13 PROCEDURE — 77334 RADIATION TREATMENT AID(S): CPT

## 2025-03-13 PROCEDURE — 77263 THER RADIOLOGY TX PLNG CPLX: CPT

## 2025-03-21 ENCOUNTER — APPOINTMENT (OUTPATIENT)
Dept: NUCLEAR MEDICINE | Facility: CLINIC | Age: 55
End: 2025-03-21

## 2025-03-21 ENCOUNTER — APPOINTMENT (OUTPATIENT)
Dept: MRI IMAGING | Facility: CLINIC | Age: 55
End: 2025-03-21

## 2025-03-24 ENCOUNTER — NON-APPOINTMENT (OUTPATIENT)
Age: 55
End: 2025-03-24

## 2025-03-24 PROCEDURE — 77295 3-D RADIOTHERAPY PLAN: CPT

## 2025-03-24 PROCEDURE — 77334 RADIATION TREATMENT AID(S): CPT

## 2025-03-24 PROCEDURE — 77300 RADIATION THERAPY DOSE PLAN: CPT

## 2025-04-01 PROCEDURE — 77280 THER RAD SIMULAJ FIELD SMPL: CPT

## 2025-04-02 PROCEDURE — 77427 RADIATION TX MANAGEMENT X5: CPT

## 2025-04-02 PROCEDURE — G6012: CPT

## 2025-04-03 ENCOUNTER — NON-APPOINTMENT (OUTPATIENT)
Age: 55
End: 2025-04-03

## 2025-04-03 PROCEDURE — G6012: CPT

## 2025-04-04 ENCOUNTER — OUTPATIENT (OUTPATIENT)
Dept: OUTPATIENT SERVICES | Facility: HOSPITAL | Age: 55
LOS: 1 days | End: 2025-04-04
Payer: COMMERCIAL

## 2025-04-04 DIAGNOSIS — C50.512 MALIGNANT NEOPLASM OF LOWER-OUTER QUADRANT OF LEFT FEMALE BREAST: ICD-10-CM

## 2025-04-04 LAB
ABO RH CONFIRMATION: SIGNIFICANT CHANGE UP
BLD GP AB SCN SERPL QL: SIGNIFICANT CHANGE UP

## 2025-04-07 PROCEDURE — 36415 COLL VENOUS BLD VENIPUNCTURE: CPT

## 2025-04-07 PROCEDURE — 86900 BLOOD TYPING SEROLOGIC ABO: CPT

## 2025-04-07 PROCEDURE — 86850 RBC ANTIBODY SCREEN: CPT

## 2025-04-07 PROCEDURE — P9040: CPT

## 2025-04-07 PROCEDURE — 86923 COMPATIBILITY TEST ELECTRIC: CPT

## 2025-04-07 PROCEDURE — 86901 BLOOD TYPING SEROLOGIC RH(D): CPT

## 2025-04-08 PROCEDURE — 77014: CPT

## 2025-04-08 PROCEDURE — G6012: CPT

## 2025-04-09 PROCEDURE — G6012: CPT

## 2025-04-10 ENCOUNTER — NON-APPOINTMENT (OUTPATIENT)
Age: 55
End: 2025-04-10

## 2025-04-10 PROCEDURE — G6012: CPT

## 2025-04-10 PROCEDURE — 77417 THER RADIOLOGY PORT IMAGE(S): CPT

## 2025-04-10 PROCEDURE — 77336 RADIATION PHYSICS CONSULT: CPT

## 2025-04-11 PROCEDURE — 77427 RADIATION TX MANAGEMENT X5: CPT

## 2025-04-11 PROCEDURE — G6012: CPT

## 2025-04-14 PROCEDURE — G6012: CPT

## 2025-04-15 PROCEDURE — G6012: CPT

## 2025-04-16 PROCEDURE — G6012: CPT

## 2025-04-17 ENCOUNTER — NON-APPOINTMENT (OUTPATIENT)
Age: 55
End: 2025-04-17

## 2025-04-17 PROCEDURE — G6012: CPT

## 2025-04-17 PROCEDURE — 77417 THER RADIOLOGY PORT IMAGE(S): CPT

## 2025-04-17 PROCEDURE — 77336 RADIATION PHYSICS CONSULT: CPT

## 2025-04-21 ENCOUNTER — NON-APPOINTMENT (OUTPATIENT)
Age: 55
End: 2025-04-21

## 2025-04-22 ENCOUNTER — APPOINTMENT (OUTPATIENT)
Dept: NEUROSURGERY | Facility: CLINIC | Age: 55
End: 2025-04-22
Payer: COMMERCIAL

## 2025-04-22 VITALS
HEART RATE: 88 BPM | SYSTOLIC BLOOD PRESSURE: 119 MMHG | WEIGHT: 250 LBS | TEMPERATURE: 97.8 F | BODY MASS INDEX: 40.18 KG/M2 | OXYGEN SATURATION: 100 % | HEIGHT: 66 IN | DIASTOLIC BLOOD PRESSURE: 67 MMHG

## 2025-04-22 PROCEDURE — 99213 OFFICE O/P EST LOW 20 MIN: CPT

## 2025-04-28 ENCOUNTER — OUTPATIENT (OUTPATIENT)
Dept: OUTPATIENT SERVICES | Facility: HOSPITAL | Age: 55
LOS: 1 days | End: 2025-04-28
Payer: COMMERCIAL

## 2025-04-28 DIAGNOSIS — D50.9 IRON DEFICIENCY ANEMIA, UNSPECIFIED: ICD-10-CM

## 2025-04-29 PROCEDURE — 86901 BLOOD TYPING SEROLOGIC RH(D): CPT

## 2025-04-29 PROCEDURE — 36415 COLL VENOUS BLD VENIPUNCTURE: CPT

## 2025-04-29 PROCEDURE — 86900 BLOOD TYPING SEROLOGIC ABO: CPT

## 2025-04-29 PROCEDURE — 86923 COMPATIBILITY TEST ELECTRIC: CPT

## 2025-04-29 PROCEDURE — 86850 RBC ANTIBODY SCREEN: CPT

## 2025-04-29 PROCEDURE — P9040: CPT

## 2025-05-05 ENCOUNTER — APPOINTMENT (OUTPATIENT)
Dept: RADIOLOGY | Facility: CLINIC | Age: 55
End: 2025-05-05
Payer: COMMERCIAL

## 2025-05-05 PROCEDURE — 72070 X-RAY EXAM THORAC SPINE 2VWS: CPT

## 2025-05-19 ENCOUNTER — APPOINTMENT (OUTPATIENT)
Dept: CARDIOLOGY | Facility: CLINIC | Age: 55
End: 2025-05-19
Payer: COMMERCIAL

## 2025-05-19 VITALS
TEMPERATURE: 97.5 F | WEIGHT: 244.71 LBS | BODY MASS INDEX: 39.33 KG/M2 | HEART RATE: 81 BPM | OXYGEN SATURATION: 95 % | SYSTOLIC BLOOD PRESSURE: 100 MMHG | HEIGHT: 66 IN | DIASTOLIC BLOOD PRESSURE: 62 MMHG

## 2025-05-19 DIAGNOSIS — C50.912 MALIGNANT NEOPLASM OF UNSPECIFIED SITE OF LEFT FEMALE BREAST: ICD-10-CM

## 2025-05-19 DIAGNOSIS — Z01.818 ENCOUNTER FOR OTHER PREPROCEDURAL EXAMINATION: ICD-10-CM

## 2025-05-19 DIAGNOSIS — E78.41 ELEVATED LIPOPROTEIN(A): ICD-10-CM

## 2025-05-19 DIAGNOSIS — R94.31 ABNORMAL ELECTROCARDIOGRAM [ECG] [EKG]: ICD-10-CM

## 2025-05-19 DIAGNOSIS — T45.1X5A CARDIOMYOPATHY DUE TO DRUG AND EXTERNAL AGENT: ICD-10-CM

## 2025-05-19 DIAGNOSIS — I42.7 CARDIOMYOPATHY DUE TO DRUG AND EXTERNAL AGENT: ICD-10-CM

## 2025-05-19 DIAGNOSIS — J30.1 ALLERGIC RHINITIS DUE TO POLLEN: ICD-10-CM

## 2025-05-19 DIAGNOSIS — C79.51 MALIGNANT NEOPLASM OF UNSPECIFIED SITE OF LEFT FEMALE BREAST: ICD-10-CM

## 2025-05-19 PROCEDURE — 93000 ELECTROCARDIOGRAM COMPLETE: CPT

## 2025-05-19 PROCEDURE — 99205 OFFICE O/P NEW HI 60 MIN: CPT | Mod: 25

## 2025-05-19 RX ORDER — LISINOPRIL 5 MG/1
5 TABLET ORAL
Qty: 90 | Refills: 3 | Status: ACTIVE | COMMUNITY
Start: 2025-05-19 | End: 1900-01-01

## 2025-05-19 RX ORDER — LISINOPRIL 5 MG/1
5 TABLET ORAL
Qty: 30 | Refills: 0 | Status: COMPLETED | COMMUNITY
Start: 2024-11-12

## 2025-05-19 RX ORDER — LAPATINIB 250 MG/1
250 TABLET ORAL
Qty: 180 | Refills: 0 | Status: COMPLETED | COMMUNITY
Start: 2024-12-12

## 2025-05-19 RX ORDER — CAPECITABINE 500 MG/1
500 TABLET, FILM COATED ORAL
Qty: 84 | Refills: 0 | Status: COMPLETED | COMMUNITY
Start: 2024-10-10

## 2025-05-19 RX ORDER — OLAPARIB 150 MG/1
150 TABLET, FILM COATED ORAL
Refills: 0 | Status: ACTIVE | COMMUNITY
Start: 2025-04-09

## 2025-05-19 RX ORDER — FAMOTIDINE 40 MG/1
40 TABLET, FILM COATED ORAL
Qty: 60 | Refills: 0 | Status: ACTIVE | COMMUNITY
Start: 2025-03-12

## 2025-05-19 RX ORDER — ERYTHROMYCIN 5 MG/G
5 OINTMENT OPHTHALMIC
Qty: 4 | Refills: 0 | Status: ACTIVE | COMMUNITY
Start: 2025-04-28

## 2025-05-19 RX ORDER — METOPROLOL SUCCINATE 25 MG/1
25 TABLET, EXTENDED RELEASE ORAL
Qty: 90 | Refills: 3 | Status: ACTIVE | COMMUNITY
Start: 2025-04-08 | End: 1900-01-01

## 2025-05-19 RX ORDER — OMEPRAZOLE 20 MG/1
20 CAPSULE, DELAYED RELEASE ORAL
Qty: 30 | Refills: 0 | Status: ACTIVE | COMMUNITY
Start: 2025-03-12

## 2025-05-19 RX ORDER — NIRMATRELVIR AND RITONAVIR 300-100 MG
20 X 150 MG & KIT ORAL
Qty: 30 | Refills: 0 | Status: COMPLETED | COMMUNITY
Start: 2025-03-24

## 2025-05-20 ENCOUNTER — OUTPATIENT (OUTPATIENT)
Dept: OUTPATIENT SERVICES | Facility: HOSPITAL | Age: 55
LOS: 1 days | End: 2025-05-20
Payer: COMMERCIAL

## 2025-05-20 DIAGNOSIS — C50.512 MALIGNANT NEOPLASM OF LOWER-OUTER QUADRANT OF LEFT FEMALE BREAST: ICD-10-CM

## 2025-05-20 LAB — BLD GP AB SCN SERPL QL: SIGNIFICANT CHANGE UP

## 2025-05-21 ENCOUNTER — LABORATORY RESULT (OUTPATIENT)
Age: 55
End: 2025-05-21

## 2025-05-21 PROBLEM — R94.31 ABNORMAL ECG: Status: ACTIVE | Noted: 2025-05-21

## 2025-05-21 PROCEDURE — P9040: CPT

## 2025-05-21 PROCEDURE — 86923 COMPATIBILITY TEST ELECTRIC: CPT

## 2025-05-21 PROCEDURE — 36415 COLL VENOUS BLD VENIPUNCTURE: CPT

## 2025-05-21 PROCEDURE — 86901 BLOOD TYPING SEROLOGIC RH(D): CPT

## 2025-05-21 PROCEDURE — 86900 BLOOD TYPING SEROLOGIC ABO: CPT

## 2025-05-21 PROCEDURE — 86850 RBC ANTIBODY SCREEN: CPT

## 2025-05-27 ENCOUNTER — APPOINTMENT (OUTPATIENT)
Dept: RADIATION ONCOLOGY | Facility: CLINIC | Age: 55
End: 2025-05-27
Payer: COMMERCIAL

## 2025-05-27 PROBLEM — E78.41 ELEVATED LIPOPROTEIN A LEVEL: Status: ACTIVE | Noted: 2025-05-27

## 2025-05-27 PROCEDURE — 99024 POSTOP FOLLOW-UP VISIT: CPT

## 2025-05-27 RX ORDER — ROSUVASTATIN CALCIUM 20 MG/1
20 TABLET, FILM COATED ORAL DAILY
Qty: 90 | Refills: 2 | Status: ACTIVE | COMMUNITY
Start: 2025-05-27 | End: 1900-01-01

## 2025-06-11 ENCOUNTER — OUTPATIENT (OUTPATIENT)
Dept: OUTPATIENT SERVICES | Facility: HOSPITAL | Age: 55
LOS: 1 days | End: 2025-06-11
Payer: COMMERCIAL

## 2025-06-11 DIAGNOSIS — C50.512 MALIGNANT NEOPLASM OF LOWER-OUTER QUADRANT OF LEFT FEMALE BREAST: ICD-10-CM

## 2025-06-11 LAB — BLD GP AB SCN SERPL QL: SIGNIFICANT CHANGE UP

## 2025-06-12 PROCEDURE — 86923 COMPATIBILITY TEST ELECTRIC: CPT

## 2025-06-12 PROCEDURE — 86901 BLOOD TYPING SEROLOGIC RH(D): CPT

## 2025-06-12 PROCEDURE — P9040: CPT

## 2025-06-12 PROCEDURE — 86900 BLOOD TYPING SEROLOGIC ABO: CPT

## 2025-06-12 PROCEDURE — 36415 COLL VENOUS BLD VENIPUNCTURE: CPT

## 2025-06-12 PROCEDURE — 86850 RBC ANTIBODY SCREEN: CPT

## 2025-07-02 ENCOUNTER — INPATIENT (INPATIENT)
Facility: HOSPITAL | Age: 55
LOS: 2 days | Discharge: HOME | End: 2025-07-05
Attending: STUDENT IN AN ORGANIZED HEALTH CARE EDUCATION/TRAINING PROGRAM | Admitting: STUDENT IN AN ORGANIZED HEALTH CARE EDUCATION/TRAINING PROGRAM
Payer: COMMERCIAL

## 2025-07-02 VITALS
HEART RATE: 82 BPM | RESPIRATION RATE: 18 BRPM | OXYGEN SATURATION: 100 % | DIASTOLIC BLOOD PRESSURE: 54 MMHG | TEMPERATURE: 98 F | SYSTOLIC BLOOD PRESSURE: 108 MMHG | WEIGHT: 229.94 LBS

## 2025-07-02 DIAGNOSIS — R73.03 PREDIABETES: ICD-10-CM

## 2025-07-02 DIAGNOSIS — D64.9 ANEMIA, UNSPECIFIED: ICD-10-CM

## 2025-07-02 DIAGNOSIS — J90 PLEURAL EFFUSION, NOT ELSEWHERE CLASSIFIED: ICD-10-CM

## 2025-07-02 DIAGNOSIS — Z98.890 OTHER SPECIFIED POSTPROCEDURAL STATES: Chronic | ICD-10-CM

## 2025-07-02 DIAGNOSIS — C50.919 MALIGNANT NEOPLASM OF UNSPECIFIED SITE OF UNSPECIFIED FEMALE BREAST: ICD-10-CM

## 2025-07-02 DIAGNOSIS — I10 ESSENTIAL (PRIMARY) HYPERTENSION: ICD-10-CM

## 2025-07-02 DIAGNOSIS — E78.5 HYPERLIPIDEMIA, UNSPECIFIED: ICD-10-CM

## 2025-07-02 LAB
ADD ON TEST-SPECIMEN IN LAB: SIGNIFICANT CHANGE UP
ADD ON TEST-SPECIMEN IN LAB: SIGNIFICANT CHANGE UP
ALBUMIN SERPL ELPH-MCNC: 3.3 G/DL — SIGNIFICANT CHANGE UP (ref 3.3–5)
ALP SERPL-CCNC: 79 U/L — SIGNIFICANT CHANGE UP (ref 40–120)
ALT FLD-CCNC: 15 U/L — SIGNIFICANT CHANGE UP (ref 4–33)
ANION GAP SERPL CALC-SCNC: 13 MMOL/L — SIGNIFICANT CHANGE UP (ref 7–14)
ANISOCYTOSIS BLD QL: SLIGHT — SIGNIFICANT CHANGE UP
APTT BLD: 21.2 SEC — LOW (ref 26.1–36.8)
AST SERPL-CCNC: 32 U/L — SIGNIFICANT CHANGE UP (ref 4–32)
BASOPHILS # BLD AUTO: 0.03 K/UL — SIGNIFICANT CHANGE UP (ref 0–0.2)
BASOPHILS # BLD MANUAL: 0 K/UL — SIGNIFICANT CHANGE UP (ref 0–0.2)
BASOPHILS NFR BLD AUTO: 0.8 % — SIGNIFICANT CHANGE UP (ref 0–2)
BASOPHILS NFR BLD MANUAL: 0 % — SIGNIFICANT CHANGE UP (ref 0–2)
BILIRUB SERPL-MCNC: 0.6 MG/DL — SIGNIFICANT CHANGE UP (ref 0.2–1.2)
BLD GP AB SCN SERPL QL: NEGATIVE — SIGNIFICANT CHANGE UP
BUN SERPL-MCNC: 10 MG/DL — SIGNIFICANT CHANGE UP (ref 7–23)
CALCIUM SERPL-MCNC: 9.6 MG/DL — SIGNIFICANT CHANGE UP (ref 8.4–10.5)
CHLORIDE SERPL-SCNC: 102 MMOL/L — SIGNIFICANT CHANGE UP (ref 98–107)
CO2 SERPL-SCNC: 23 MMOL/L — SIGNIFICANT CHANGE UP (ref 22–31)
CREAT SERPL-MCNC: 0.84 MG/DL — SIGNIFICANT CHANGE UP (ref 0.5–1.3)
DACRYOCYTES BLD QL SMEAR: SLIGHT — SIGNIFICANT CHANGE UP
EGFR: 84 ML/MIN/1.73M2 — SIGNIFICANT CHANGE UP
EGFR: 84 ML/MIN/1.73M2 — SIGNIFICANT CHANGE UP
EOSINOPHIL # BLD AUTO: 0.03 K/UL — SIGNIFICANT CHANGE UP (ref 0–0.5)
EOSINOPHIL # BLD MANUAL: 0 K/UL — SIGNIFICANT CHANGE UP (ref 0–0.5)
EOSINOPHIL NFR BLD AUTO: 0.8 % — SIGNIFICANT CHANGE UP (ref 0–6)
EOSINOPHIL NFR BLD MANUAL: 0 % — SIGNIFICANT CHANGE UP (ref 0–6)
GIANT PLATELETS BLD QL SMEAR: PRESENT
GLUCOSE SERPL-MCNC: 93 MG/DL — SIGNIFICANT CHANGE UP (ref 70–99)
HCT VFR BLD CALC: 16.8 % — CRITICAL LOW (ref 34.5–45)
HGB BLD-MCNC: 5.5 G/DL — CRITICAL LOW (ref 11.5–15.5)
IMM GRANULOCYTES # BLD AUTO: 0.26 K/UL — HIGH (ref 0–0.07)
IMM GRANULOCYTES NFR BLD AUTO: 6.5 % — HIGH (ref 0–0.9)
INR BLD: 1.22 RATIO — HIGH (ref 0.85–1.16)
LYMPHOCYTES # BLD AUTO: 0.29 K/UL — LOW (ref 1–3.3)
LYMPHOCYTES # BLD MANUAL: 0.33 K/UL — LOW (ref 1–3.3)
LYMPHOCYTES NFR BLD AUTO: 7.3 % — LOW (ref 13–44)
LYMPHOCYTES NFR BLD MANUAL: 8.3 % — LOW (ref 13–44)
MACROCYTES BLD QL: SLIGHT — SIGNIFICANT CHANGE UP
MAGNESIUM SERPL-MCNC: 2.2 MG/DL — SIGNIFICANT CHANGE UP (ref 1.6–2.6)
MANUAL METAMYELOCYTE #: 0.03 K/UL — HIGH (ref 0–0)
MANUAL MYELOCYTE #: 0.13 K/UL — HIGH (ref 0–0)
MANUAL NEUTROPHIL BANDS #: 0.1 K/UL — SIGNIFICANT CHANGE UP (ref 0–0.84)
MANUAL NRBC #: 0.04 K/UL — HIGH (ref 0–0)
MANUAL REACTIVE LYMPHOCYTES #: 0.03 K/UL — SIGNIFICANT CHANGE UP (ref 0–0.63)
MCHC RBC-ENTMCNC: 30.9 PG — SIGNIFICANT CHANGE UP (ref 27–34)
MCHC RBC-ENTMCNC: 32.7 G/DL — SIGNIFICANT CHANGE UP (ref 32–36)
MCV RBC AUTO: 94.4 FL — SIGNIFICANT CHANGE UP (ref 80–100)
METAMYELOCYTES # FLD: 0.8 % — HIGH (ref 0–0)
METAMYELOCYTES NFR BLD: 0.8 % — HIGH (ref 0–0)
MONOCYTES # BLD AUTO: 0.41 K/UL — SIGNIFICANT CHANGE UP (ref 0–0.9)
MONOCYTES # BLD MANUAL: 0.27 K/UL — SIGNIFICANT CHANGE UP (ref 0–0.9)
MONOCYTES NFR BLD AUTO: 10.3 % — SIGNIFICANT CHANGE UP (ref 2–14)
MONOCYTES NFR BLD MANUAL: 6.7 % — SIGNIFICANT CHANGE UP (ref 2–14)
MYELOCYTES NFR BLD: 3.3 % — HIGH (ref 0–0)
NEUTROPHILS # BLD AUTO: 2.98 K/UL — SIGNIFICANT CHANGE UP (ref 1.8–7.4)
NEUTROPHILS # BLD MANUAL: 3.1 K/UL — SIGNIFICANT CHANGE UP (ref 1.8–7.4)
NEUTROPHILS NFR BLD AUTO: 74.3 % — SIGNIFICANT CHANGE UP (ref 43–77)
NEUTROPHILS NFR BLD MANUAL: 77.6 % — HIGH (ref 43–77)
NEUTS BAND # BLD: 2.5 % — SIGNIFICANT CHANGE UP (ref 0–8)
NEUTS BAND NFR BLD: 2.5 % — SIGNIFICANT CHANGE UP (ref 0–8)
NRBC # BLD AUTO: 0.05 K/UL — HIGH (ref 0–0)
NRBC # BLD: 1 /100 WBCS — HIGH (ref 0–0)
NRBC # FLD: 0.05 K/UL — HIGH (ref 0–0)
NRBC BLD AUTO-RTO: 1 /100 WBCS — HIGH (ref 0–0)
NRBC BLD-RTO: 1 /100 WBCS — HIGH (ref 0–0)
NT-PROBNP SERPL-SCNC: 48 PG/ML — SIGNIFICANT CHANGE UP
PHOSPHATE SERPL-MCNC: 4.1 MG/DL — SIGNIFICANT CHANGE UP (ref 2.5–4.5)
PLAT MORPH BLD: NORMAL — SIGNIFICANT CHANGE UP
PLATELET # BLD AUTO: 189 K/UL — SIGNIFICANT CHANGE UP (ref 150–400)
PLATELET COUNT - ESTIMATE: NORMAL — SIGNIFICANT CHANGE UP
PMV BLD: 10 FL — SIGNIFICANT CHANGE UP (ref 7–13)
POIKILOCYTOSIS BLD QL AUTO: SLIGHT — SIGNIFICANT CHANGE UP
POLYCHROMASIA BLD QL SMEAR: SLIGHT — SIGNIFICANT CHANGE UP
POTASSIUM SERPL-MCNC: 4.5 MMOL/L — SIGNIFICANT CHANGE UP (ref 3.5–5.3)
POTASSIUM SERPL-SCNC: 4.5 MMOL/L — SIGNIFICANT CHANGE UP (ref 3.5–5.3)
PROT SERPL-MCNC: 6.8 G/DL — SIGNIFICANT CHANGE UP (ref 6–8.3)
PROTHROM AB SERPL-ACNC: 14.1 SEC — HIGH (ref 9.9–13.4)
RBC # BLD: 1.78 M/UL — LOW (ref 3.8–5.2)
RBC # FLD: 21 % — HIGH (ref 10.3–14.5)
RBC BLD AUTO: ABNORMAL
RH IG SCN BLD-IMP: POSITIVE — SIGNIFICANT CHANGE UP
RH IG SCN BLD-IMP: POSITIVE — SIGNIFICANT CHANGE UP
SODIUM SERPL-SCNC: 138 MMOL/L — SIGNIFICANT CHANGE UP (ref 135–145)
TROPONIN T, HIGH SENSITIVITY RESULT: 8 NG/L — SIGNIFICANT CHANGE UP
VARIANT LYMPHS # BLD: 0.8 % — SIGNIFICANT CHANGE UP (ref 0–6)
VARIANT LYMPHS NFR BLD MANUAL: 0.8 % — SIGNIFICANT CHANGE UP (ref 0–6)
WBC # BLD: 4 K/UL — SIGNIFICANT CHANGE UP (ref 3.8–10.5)
WBC # FLD AUTO: 4 K/UL — SIGNIFICANT CHANGE UP (ref 3.8–10.5)

## 2025-07-02 PROCEDURE — 99285 EMERGENCY DEPT VISIT HI MDM: CPT

## 2025-07-02 PROCEDURE — 71046 X-RAY EXAM CHEST 2 VIEWS: CPT | Mod: 26

## 2025-07-02 PROCEDURE — 71275 CT ANGIOGRAPHY CHEST: CPT | Mod: 26

## 2025-07-02 PROCEDURE — 99223 1ST HOSP IP/OBS HIGH 75: CPT

## 2025-07-02 PROCEDURE — 99497 ADVNCD CARE PLAN 30 MIN: CPT | Mod: 25

## 2025-07-02 RX ORDER — LISINOPRIL 5 MG/1
5 TABLET ORAL
Refills: 0 | Status: DISCONTINUED | OUTPATIENT
Start: 2025-07-02 | End: 2025-07-05

## 2025-07-02 RX ORDER — TRAMADOL HYDROCHLORIDE 50 MG/1
50 TABLET, FILM COATED ORAL EVERY 6 HOURS
Refills: 0 | Status: DISCONTINUED | OUTPATIENT
Start: 2025-07-02 | End: 2025-07-05

## 2025-07-02 RX ORDER — METOPROLOL SUCCINATE 50 MG/1
25 TABLET, EXTENDED RELEASE ORAL
Refills: 0 | Status: DISCONTINUED | OUTPATIENT
Start: 2025-07-02 | End: 2025-07-05

## 2025-07-02 RX ORDER — ACETAMINOPHEN 500 MG/5ML
650 LIQUID (ML) ORAL EVERY 6 HOURS
Refills: 0 | Status: DISCONTINUED | OUTPATIENT
Start: 2025-07-02 | End: 2025-07-04

## 2025-07-02 RX ORDER — ROSUVASTATIN CALCIUM 20 MG/1
20 TABLET, FILM COATED ORAL AT BEDTIME
Refills: 0 | Status: DISCONTINUED | OUTPATIENT
Start: 2025-07-02 | End: 2025-07-05

## 2025-07-02 RX ORDER — SENNA 187 MG
2 TABLET ORAL AT BEDTIME
Refills: 0 | Status: DISCONTINUED | OUTPATIENT
Start: 2025-07-02 | End: 2025-07-05

## 2025-07-02 RX ORDER — MAGNESIUM, ALUMINUM HYDROXIDE 200-200 MG
30 TABLET,CHEWABLE ORAL EVERY 4 HOURS
Refills: 0 | Status: DISCONTINUED | OUTPATIENT
Start: 2025-07-02 | End: 2025-07-05

## 2025-07-02 RX ORDER — METHOCARBAMOL 500 MG/1
500 TABLET, FILM COATED ORAL EVERY 8 HOURS
Refills: 0 | Status: DISCONTINUED | OUTPATIENT
Start: 2025-07-02 | End: 2025-07-05

## 2025-07-02 RX ORDER — MELATONIN 5 MG
3 TABLET ORAL AT BEDTIME
Refills: 0 | Status: DISCONTINUED | OUTPATIENT
Start: 2025-07-02 | End: 2025-07-05

## 2025-07-02 RX ORDER — POLYETHYLENE GLYCOL 3350 17 G/17G
17 POWDER, FOR SOLUTION ORAL
Refills: 0 | Status: DISCONTINUED | OUTPATIENT
Start: 2025-07-02 | End: 2025-07-05

## 2025-07-02 RX ORDER — ONDANSETRON HCL/PF 4 MG/2 ML
4 VIAL (ML) INJECTION EVERY 8 HOURS
Refills: 0 | Status: DISCONTINUED | OUTPATIENT
Start: 2025-07-02 | End: 2025-07-05

## 2025-07-02 RX ADMIN — Medication 2 TABLET(S): at 22:45

## 2025-07-02 RX ADMIN — ROSUVASTATIN CALCIUM 20 MILLIGRAM(S): 20 TABLET, FILM COATED ORAL at 22:45

## 2025-07-03 ENCOUNTER — RESULT REVIEW (OUTPATIENT)
Age: 55
End: 2025-07-03

## 2025-07-03 LAB
ALBUMIN FLD-MCNC: 2.7 G/DL — SIGNIFICANT CHANGE UP
ANION GAP SERPL CALC-SCNC: 13 MMOL/L — SIGNIFICANT CHANGE UP (ref 7–14)
B PERT IGG+IGM PNL SER: ABNORMAL
BUN SERPL-MCNC: 10 MG/DL — SIGNIFICANT CHANGE UP (ref 7–23)
CALCIUM SERPL-MCNC: 8.9 MG/DL — SIGNIFICANT CHANGE UP (ref 8.4–10.5)
CHLORIDE SERPL-SCNC: 102 MMOL/L — SIGNIFICANT CHANGE UP (ref 98–107)
CHOLEST SERPL-MCNC: 133 MG/DL — SIGNIFICANT CHANGE UP
CO2 SERPL-SCNC: 22 MMOL/L — SIGNIFICANT CHANGE UP (ref 22–31)
COLOR FLD: ABNORMAL
CREAT SERPL-MCNC: 0.66 MG/DL — SIGNIFICANT CHANGE UP (ref 0.5–1.3)
EGFR: 105 ML/MIN/1.73M2 — SIGNIFICANT CHANGE UP
EGFR: 105 ML/MIN/1.73M2 — SIGNIFICANT CHANGE UP
EOSINOPHIL # FLD: 1 % — SIGNIFICANT CHANGE UP
FLUID INTAKE SUBSTANCE CLASS: SIGNIFICANT CHANGE UP
FOLATE+VIT B12 SERBLD-IMP: 0 % — SIGNIFICANT CHANGE UP
GLUCOSE FLD-MCNC: 94 MG/DL — SIGNIFICANT CHANGE UP
GLUCOSE SERPL-MCNC: 93 MG/DL — SIGNIFICANT CHANGE UP (ref 70–99)
GRAM STN FLD: SIGNIFICANT CHANGE UP
HCT VFR BLD CALC: 22 % — LOW (ref 34.5–45)
HCT VFR BLD CALC: 23.3 % — LOW (ref 34.5–45)
HDLC SERPL-MCNC: 54 MG/DL — SIGNIFICANT CHANGE UP
HGB BLD-MCNC: 7.3 G/DL — LOW (ref 11.5–15.5)
HGB BLD-MCNC: 7.5 G/DL — LOW (ref 11.5–15.5)
LDH SERPL L TO P-CCNC: 446 U/L — SIGNIFICANT CHANGE UP
LDLC SERPL-MCNC: 66 MG/DL — SIGNIFICANT CHANGE UP
LIPID PNL WITH DIRECT LDL SERPL: 66 MG/DL — SIGNIFICANT CHANGE UP
LYMPHOCYTES # FLD: 26 % — SIGNIFICANT CHANGE UP
MCHC RBC-ENTMCNC: 29.2 PG — SIGNIFICANT CHANGE UP (ref 27–34)
MCHC RBC-ENTMCNC: 30 PG — SIGNIFICANT CHANGE UP (ref 27–34)
MCHC RBC-ENTMCNC: 32.2 G/DL — SIGNIFICANT CHANGE UP (ref 32–36)
MCHC RBC-ENTMCNC: 33.2 G/DL — SIGNIFICANT CHANGE UP (ref 32–36)
MCV RBC AUTO: 90.5 FL — SIGNIFICANT CHANGE UP (ref 80–100)
MCV RBC AUTO: 90.7 FL — SIGNIFICANT CHANGE UP (ref 80–100)
MESOTHL CELL # FLD: 0 % — SIGNIFICANT CHANGE UP
MONOS+MACROS # FLD: 30 % — SIGNIFICANT CHANGE UP
NEUTROPHILS-BODY FLUID: 43 % — SIGNIFICANT CHANGE UP
NONHDLC SERPL-MCNC: 79 MG/DL — SIGNIFICANT CHANGE UP
NRBC # BLD AUTO: 0.09 K/UL — HIGH (ref 0–0)
NRBC # BLD AUTO: 0.1 K/UL — HIGH (ref 0–0)
NRBC # FLD: 0.09 K/UL — HIGH (ref 0–0)
NRBC # FLD: 0.1 K/UL — HIGH (ref 0–0)
NRBC # FLD: 35 % — SIGNIFICANT CHANGE UP
NRBC BLD AUTO-RTO: 2 /100 WBCS — HIGH (ref 0–0)
NRBC BLD AUTO-RTO: 3 /100 WBCS — HIGH (ref 0–0)
OTHER CELLS FLD MANUAL: 0 % — SIGNIFICANT CHANGE UP
PLATELET # BLD AUTO: 149 K/UL — LOW (ref 150–400)
PLATELET # BLD AUTO: 159 K/UL — SIGNIFICANT CHANGE UP (ref 150–400)
PMV BLD: 10.1 FL — SIGNIFICANT CHANGE UP (ref 7–13)
PMV BLD: 9.7 FL — SIGNIFICANT CHANGE UP (ref 7–13)
POTASSIUM SERPL-MCNC: 4.3 MMOL/L — SIGNIFICANT CHANGE UP (ref 3.5–5.3)
POTASSIUM SERPL-SCNC: 4.3 MMOL/L — SIGNIFICANT CHANGE UP (ref 3.5–5.3)
PROT FLD-MCNC: 4.2 G/DL — SIGNIFICANT CHANGE UP
RBC # BLD: 2.43 M/UL — LOW (ref 3.8–5.2)
RBC # BLD: 2.57 M/UL — LOW (ref 3.8–5.2)
RBC # FLD: 19.9 % — HIGH (ref 10.3–14.5)
RBC # FLD: 20.5 % — HIGH (ref 10.3–14.5)
RCV VOL RI: SIGNIFICANT CHANGE UP CELLS/UL
SODIUM SERPL-SCNC: 137 MMOL/L — SIGNIFICANT CHANGE UP (ref 135–145)
SPECIMEN SOURCE FLD: SIGNIFICANT CHANGE UP
SPECIMEN SOURCE: SIGNIFICANT CHANGE UP
TOTAL CELLS COUNTED, BODY FLUID: 100 CELLS — SIGNIFICANT CHANGE UP
TOTAL NUCLEATED CELL COUNT, BODY FLUID: 562 CELLS/UL — SIGNIFICANT CHANGE UP
TRIGL SERPL-MCNC: 61 MG/DL — SIGNIFICANT CHANGE UP
TUBE TYPE: SIGNIFICANT CHANGE UP
WBC # BLD: 3.79 K/UL — LOW (ref 3.8–10.5)
WBC # BLD: 3.84 K/UL — SIGNIFICANT CHANGE UP (ref 3.8–10.5)
WBC # FLD AUTO: 3.79 K/UL — LOW (ref 3.8–10.5)
WBC # FLD AUTO: 3.84 K/UL — SIGNIFICANT CHANGE UP (ref 3.8–10.5)

## 2025-07-03 PROCEDURE — 88112 CYTOPATH CELL ENHANCE TECH: CPT | Mod: 26

## 2025-07-03 PROCEDURE — 76604 US EXAM CHEST: CPT | Mod: 26,GC

## 2025-07-03 PROCEDURE — 99233 SBSQ HOSP IP/OBS HIGH 50: CPT

## 2025-07-03 PROCEDURE — 32556 INSERT CATH PLEURA W/O IMAGE: CPT | Mod: RT,GC

## 2025-07-03 PROCEDURE — 88305 TISSUE EXAM BY PATHOLOGIST: CPT | Mod: 26

## 2025-07-03 PROCEDURE — 71045 X-RAY EXAM CHEST 1 VIEW: CPT | Mod: 26

## 2025-07-03 PROCEDURE — 99223 1ST HOSP IP/OBS HIGH 75: CPT | Mod: GC,25

## 2025-07-03 RX ADMIN — METOPROLOL SUCCINATE 25 MILLIGRAM(S): 50 TABLET, EXTENDED RELEASE ORAL at 09:14

## 2025-07-03 RX ADMIN — TRAMADOL HYDROCHLORIDE 50 MILLIGRAM(S): 50 TABLET, FILM COATED ORAL at 22:57

## 2025-07-03 RX ADMIN — ROSUVASTATIN CALCIUM 20 MILLIGRAM(S): 20 TABLET, FILM COATED ORAL at 21:47

## 2025-07-03 RX ADMIN — TRAMADOL HYDROCHLORIDE 50 MILLIGRAM(S): 50 TABLET, FILM COATED ORAL at 23:57

## 2025-07-03 RX ADMIN — POLYETHYLENE GLYCOL 3350 17 GRAM(S): 17 POWDER, FOR SOLUTION ORAL at 05:19

## 2025-07-03 RX ADMIN — POLYETHYLENE GLYCOL 3350 17 GRAM(S): 17 POWDER, FOR SOLUTION ORAL at 18:05

## 2025-07-03 RX ADMIN — Medication 2 TABLET(S): at 21:47

## 2025-07-03 RX ADMIN — Medication 40 MILLIGRAM(S): at 18:06

## 2025-07-03 RX ADMIN — Medication 40 MILLIGRAM(S): at 07:18

## 2025-07-03 RX ADMIN — Medication 40 MILLIGRAM(S): at 05:18

## 2025-07-03 RX ADMIN — LISINOPRIL 5 MILLIGRAM(S): 5 TABLET ORAL at 09:14

## 2025-07-04 LAB
ALBUMIN SERPL ELPH-MCNC: 3.1 G/DL — LOW (ref 3.3–5)
ALP SERPL-CCNC: 72 U/L — SIGNIFICANT CHANGE UP (ref 40–120)
ALT FLD-CCNC: 11 U/L — SIGNIFICANT CHANGE UP (ref 4–33)
ANION GAP SERPL CALC-SCNC: 10 MMOL/L — SIGNIFICANT CHANGE UP (ref 7–14)
ANISOCYTOSIS BLD QL: ABNORMAL
AST SERPL-CCNC: 20 U/L — SIGNIFICANT CHANGE UP (ref 4–32)
BASOPHILS # BLD AUTO: 0.03 K/UL — SIGNIFICANT CHANGE UP (ref 0–0.2)
BASOPHILS # BLD MANUAL: 0.03 K/UL — SIGNIFICANT CHANGE UP (ref 0–0.2)
BASOPHILS NFR BLD AUTO: 0.9 % — SIGNIFICANT CHANGE UP (ref 0–2)
BASOPHILS NFR BLD MANUAL: 0.8 % — SIGNIFICANT CHANGE UP (ref 0–2)
BILIRUB SERPL-MCNC: 0.5 MG/DL — SIGNIFICANT CHANGE UP (ref 0.2–1.2)
BUN SERPL-MCNC: 5 MG/DL — LOW (ref 7–23)
CALCIUM SERPL-MCNC: 9.2 MG/DL — SIGNIFICANT CHANGE UP (ref 8.4–10.5)
CHLORIDE SERPL-SCNC: 103 MMOL/L — SIGNIFICANT CHANGE UP (ref 98–107)
CO2 SERPL-SCNC: 26 MMOL/L — SIGNIFICANT CHANGE UP (ref 22–31)
CREAT SERPL-MCNC: 0.61 MG/DL — SIGNIFICANT CHANGE UP (ref 0.5–1.3)
EGFR: 108 ML/MIN/1.73M2 — SIGNIFICANT CHANGE UP
EGFR: 108 ML/MIN/1.73M2 — SIGNIFICANT CHANGE UP
EOSINOPHIL # BLD AUTO: 0.03 K/UL — SIGNIFICANT CHANGE UP (ref 0–0.5)
EOSINOPHIL # BLD MANUAL: 0.06 K/UL — SIGNIFICANT CHANGE UP (ref 0–0.5)
EOSINOPHIL NFR BLD AUTO: 0.9 % — SIGNIFICANT CHANGE UP (ref 0–6)
EOSINOPHIL NFR BLD MANUAL: 1.7 % — SIGNIFICANT CHANGE UP (ref 0–6)
GIANT PLATELETS BLD QL SMEAR: PRESENT
GLUCOSE SERPL-MCNC: 95 MG/DL — SIGNIFICANT CHANGE UP (ref 70–99)
HCT VFR BLD CALC: 22.3 % — LOW (ref 34.5–45)
HCT VFR BLD CALC: 25.5 % — LOW (ref 34.5–45)
HGB BLD-MCNC: 7.5 G/DL — LOW (ref 11.5–15.5)
HGB BLD-MCNC: 8.6 G/DL — LOW (ref 11.5–15.5)
IMM GRANULOCYTES # BLD AUTO: 0.26 K/UL — HIGH (ref 0–0.07)
IMM GRANULOCYTES NFR BLD AUTO: 8 % — HIGH (ref 0–0.9)
LYMPHOCYTES # BLD AUTO: 0.25 K/UL — LOW (ref 1–3.3)
LYMPHOCYTES # BLD MANUAL: 0.27 K/UL — LOW (ref 1–3.3)
LYMPHOCYTES NFR BLD AUTO: 7.7 % — LOW (ref 13–44)
LYMPHOCYTES NFR BLD MANUAL: 8.4 % — LOW (ref 13–44)
MACROCYTES BLD QL: ABNORMAL
MAGNESIUM SERPL-MCNC: 2.3 MG/DL — SIGNIFICANT CHANGE UP (ref 1.6–2.6)
MANUAL METAMYELOCYTE #: 0.06 K/UL — HIGH (ref 0–0)
MANUAL MYELOCYTE #: 0.08 K/UL — HIGH (ref 0–0)
MANUAL NEUTROPHIL BANDS #: 0.16 K/UL — SIGNIFICANT CHANGE UP (ref 0–0.84)
MANUAL NRBC #: 0.13 K/UL — HIGH (ref 0–0)
MANUAL REACTIVE LYMPHOCYTES #: 0.06 K/UL — SIGNIFICANT CHANGE UP (ref 0–0.63)
MCHC RBC-ENTMCNC: 30.2 PG — SIGNIFICANT CHANGE UP (ref 27–34)
MCHC RBC-ENTMCNC: 30.5 PG — SIGNIFICANT CHANGE UP (ref 27–34)
MCHC RBC-ENTMCNC: 33.6 G/DL — SIGNIFICANT CHANGE UP (ref 32–36)
MCHC RBC-ENTMCNC: 33.7 G/DL — SIGNIFICANT CHANGE UP (ref 32–36)
MCV RBC AUTO: 89.9 FL — SIGNIFICANT CHANGE UP (ref 80–100)
MCV RBC AUTO: 90.4 FL — SIGNIFICANT CHANGE UP (ref 80–100)
METAMYELOCYTES # FLD: 1.7 % — HIGH (ref 0–0)
METAMYELOCYTES NFR BLD: 1.7 % — HIGH (ref 0–0)
MONOCYTES # BLD AUTO: 0.28 K/UL — SIGNIFICANT CHANGE UP (ref 0–0.9)
MONOCYTES # BLD MANUAL: 0.11 K/UL — SIGNIFICANT CHANGE UP (ref 0–0.9)
MONOCYTES NFR BLD AUTO: 8.6 % — SIGNIFICANT CHANGE UP (ref 2–14)
MONOCYTES NFR BLD MANUAL: 3.4 % — SIGNIFICANT CHANGE UP (ref 2–14)
MYELOCYTES NFR BLD: 2.5 % — HIGH (ref 0–0)
NEUTROPHILS # BLD AUTO: 2.41 K/UL — SIGNIFICANT CHANGE UP (ref 1.8–7.4)
NEUTROPHILS # BLD MANUAL: 2.44 K/UL — SIGNIFICANT CHANGE UP (ref 1.8–7.4)
NEUTROPHILS NFR BLD AUTO: 73.9 % — SIGNIFICANT CHANGE UP (ref 43–77)
NEUTROPHILS NFR BLD MANUAL: 74.8 % — SIGNIFICANT CHANGE UP (ref 43–77)
NEUTS BAND # BLD: 5 % — SIGNIFICANT CHANGE UP (ref 0–8)
NEUTS BAND NFR BLD: 5 % — SIGNIFICANT CHANGE UP (ref 0–8)
NRBC # BLD AUTO: 0.13 K/UL — HIGH (ref 0–0)
NRBC # BLD AUTO: 0.23 K/UL — HIGH (ref 0–0)
NRBC # BLD: 4 /100 WBCS — HIGH (ref 0–0)
NRBC # FLD: 0.13 K/UL — HIGH (ref 0–0)
NRBC # FLD: 0.23 K/UL — HIGH (ref 0–0)
NRBC BLD AUTO-RTO: 4 /100 WBCS — HIGH (ref 0–0)
NRBC BLD AUTO-RTO: 7 /100 WBCS — HIGH (ref 0–0)
NRBC BLD-RTO: 4 /100 WBCS — HIGH (ref 0–0)
OVALOCYTES BLD QL SMEAR: SLIGHT — SIGNIFICANT CHANGE UP
PH FLD: 7.7 — SIGNIFICANT CHANGE UP
PHOSPHATE SERPL-MCNC: 4.2 MG/DL — SIGNIFICANT CHANGE UP (ref 2.5–4.5)
PLAT MORPH BLD: ABNORMAL
PLATELET # BLD AUTO: 154 K/UL — SIGNIFICANT CHANGE UP (ref 150–400)
PLATELET # BLD AUTO: 155 K/UL — SIGNIFICANT CHANGE UP (ref 150–400)
PLATELET COUNT - ESTIMATE: NORMAL — SIGNIFICANT CHANGE UP
PMV BLD: 9.6 FL — SIGNIFICANT CHANGE UP (ref 7–13)
PMV BLD: 9.8 FL — SIGNIFICANT CHANGE UP (ref 7–13)
POIKILOCYTOSIS BLD QL AUTO: SLIGHT — SIGNIFICANT CHANGE UP
POLYCHROMASIA BLD QL SMEAR: ABNORMAL
POTASSIUM SERPL-MCNC: 4.3 MMOL/L — SIGNIFICANT CHANGE UP (ref 3.5–5.3)
POTASSIUM SERPL-SCNC: 4.3 MMOL/L — SIGNIFICANT CHANGE UP (ref 3.5–5.3)
PROT SERPL-MCNC: 6.3 G/DL — SIGNIFICANT CHANGE UP (ref 6–8.3)
RBC # BLD: 2.48 M/UL — LOW (ref 3.8–5.2)
RBC # BLD: 2.82 M/UL — LOW (ref 3.8–5.2)
RBC # FLD: 19 % — HIGH (ref 10.3–14.5)
RBC # FLD: 19.6 % — HIGH (ref 10.3–14.5)
RBC BLD AUTO: ABNORMAL
SODIUM SERPL-SCNC: 139 MMOL/L — SIGNIFICANT CHANGE UP (ref 135–145)
VARIANT LYMPHS # BLD: 1.7 % — SIGNIFICANT CHANGE UP (ref 0–6)
VARIANT LYMPHS NFR BLD MANUAL: 1.7 % — SIGNIFICANT CHANGE UP (ref 0–6)
WBC # BLD: 3.26 K/UL — LOW (ref 3.8–10.5)
WBC # BLD: 3.41 K/UL — LOW (ref 3.8–10.5)
WBC # FLD AUTO: 3.26 K/UL — LOW (ref 3.8–10.5)
WBC # FLD AUTO: 3.41 K/UL — LOW (ref 3.8–10.5)

## 2025-07-04 PROCEDURE — 99233 SBSQ HOSP IP/OBS HIGH 50: CPT

## 2025-07-04 PROCEDURE — 99233 SBSQ HOSP IP/OBS HIGH 50: CPT | Mod: GC

## 2025-07-04 RX ORDER — ENOXAPARIN SODIUM 100 MG/ML
40 INJECTION SUBCUTANEOUS EVERY 24 HOURS
Refills: 0 | Status: DISCONTINUED | OUTPATIENT
Start: 2025-07-04 | End: 2025-07-05

## 2025-07-04 RX ADMIN — POLYETHYLENE GLYCOL 3350 17 GRAM(S): 17 POWDER, FOR SOLUTION ORAL at 05:22

## 2025-07-04 RX ADMIN — Medication 40 MILLIGRAM(S): at 05:21

## 2025-07-04 RX ADMIN — METOPROLOL SUCCINATE 25 MILLIGRAM(S): 50 TABLET, EXTENDED RELEASE ORAL at 08:56

## 2025-07-04 RX ADMIN — LISINOPRIL 5 MILLIGRAM(S): 5 TABLET ORAL at 08:56

## 2025-07-04 RX ADMIN — ENOXAPARIN SODIUM 40 MILLIGRAM(S): 100 INJECTION SUBCUTANEOUS at 17:25

## 2025-07-04 RX ADMIN — ROSUVASTATIN CALCIUM 20 MILLIGRAM(S): 20 TABLET, FILM COATED ORAL at 21:36

## 2025-07-04 RX ADMIN — Medication 40 MILLIGRAM(S): at 17:20

## 2025-07-04 RX ADMIN — Medication 1 APPLICATION(S): at 17:24

## 2025-07-05 ENCOUNTER — TRANSCRIPTION ENCOUNTER (OUTPATIENT)
Age: 55
End: 2025-07-05

## 2025-07-05 VITALS
RESPIRATION RATE: 18 BRPM | DIASTOLIC BLOOD PRESSURE: 62 MMHG | OXYGEN SATURATION: 100 % | HEART RATE: 78 BPM | TEMPERATURE: 98 F | SYSTOLIC BLOOD PRESSURE: 115 MMHG

## 2025-07-05 LAB
ALBUMIN SERPL ELPH-MCNC: 3.3 G/DL — SIGNIFICANT CHANGE UP (ref 3.3–5)
ALP SERPL-CCNC: 74 U/L — SIGNIFICANT CHANGE UP (ref 40–120)
ALT FLD-CCNC: 12 U/L — SIGNIFICANT CHANGE UP (ref 4–33)
ANION GAP SERPL CALC-SCNC: 12 MMOL/L — SIGNIFICANT CHANGE UP (ref 7–14)
AST SERPL-CCNC: 16 U/L — SIGNIFICANT CHANGE UP (ref 4–32)
BASOPHILS # BLD AUTO: 0.03 K/UL — SIGNIFICANT CHANGE UP (ref 0–0.2)
BASOPHILS NFR BLD AUTO: 0.9 % — SIGNIFICANT CHANGE UP (ref 0–2)
BILIRUB SERPL-MCNC: 0.4 MG/DL — SIGNIFICANT CHANGE UP (ref 0.2–1.2)
BLD GP AB SCN SERPL QL: NEGATIVE — SIGNIFICANT CHANGE UP
BUN SERPL-MCNC: 6 MG/DL — LOW (ref 7–23)
CALCIUM SERPL-MCNC: 8.7 MG/DL — SIGNIFICANT CHANGE UP (ref 8.4–10.5)
CHLORIDE SERPL-SCNC: 104 MMOL/L — SIGNIFICANT CHANGE UP (ref 98–107)
CO2 SERPL-SCNC: 23 MMOL/L — SIGNIFICANT CHANGE UP (ref 22–31)
CREAT SERPL-MCNC: 0.6 MG/DL — SIGNIFICANT CHANGE UP (ref 0.5–1.3)
EGFR: 108 ML/MIN/1.73M2 — SIGNIFICANT CHANGE UP
EGFR: 108 ML/MIN/1.73M2 — SIGNIFICANT CHANGE UP
EOSINOPHIL # BLD AUTO: 0.03 K/UL — SIGNIFICANT CHANGE UP (ref 0–0.5)
EOSINOPHIL NFR BLD AUTO: 0.9 % — SIGNIFICANT CHANGE UP (ref 0–6)
FLOW CYTOMETRY FINAL REPORT: SIGNIFICANT CHANGE UP
GLUCOSE SERPL-MCNC: 88 MG/DL — SIGNIFICANT CHANGE UP (ref 70–99)
HCT VFR BLD CALC: 26.7 % — LOW (ref 34.5–45)
HGB BLD-MCNC: 8.8 G/DL — LOW (ref 11.5–15.5)
IMM GRANULOCYTES # BLD AUTO: 0.29 K/UL — HIGH (ref 0–0.07)
IMM GRANULOCYTES NFR BLD AUTO: 8.7 % — HIGH (ref 0–0.9)
LYMPHOCYTES # BLD AUTO: 0.27 K/UL — LOW (ref 1–3.3)
LYMPHOCYTES NFR BLD AUTO: 8.1 % — LOW (ref 13–44)
MAGNESIUM SERPL-MCNC: 2.3 MG/DL — SIGNIFICANT CHANGE UP (ref 1.6–2.6)
MANUAL SMEAR VERIFICATION: SIGNIFICANT CHANGE UP
MCHC RBC-ENTMCNC: 30 PG — SIGNIFICANT CHANGE UP (ref 27–34)
MCHC RBC-ENTMCNC: 33 G/DL — SIGNIFICANT CHANGE UP (ref 32–36)
MCV RBC AUTO: 91.1 FL — SIGNIFICANT CHANGE UP (ref 80–100)
MONOCYTES # BLD AUTO: 0.34 K/UL — SIGNIFICANT CHANGE UP (ref 0–0.9)
MONOCYTES NFR BLD AUTO: 10.1 % — SIGNIFICANT CHANGE UP (ref 2–14)
MRSA PCR RESULT.: SIGNIFICANT CHANGE UP
NEUTROPHILS # BLD AUTO: 2.39 K/UL — SIGNIFICANT CHANGE UP (ref 1.8–7.4)
NEUTROPHILS NFR BLD AUTO: 71.3 % — SIGNIFICANT CHANGE UP (ref 43–77)
NRBC # BLD AUTO: 0.17 K/UL — HIGH (ref 0–0)
NRBC # FLD: 0.17 K/UL — HIGH (ref 0–0)
NRBC BLD AUTO-RTO: 5 /100 WBCS — HIGH (ref 0–0)
PHOSPHATE SERPL-MCNC: 3.5 MG/DL — SIGNIFICANT CHANGE UP (ref 2.5–4.5)
PLAT MORPH BLD: SIGNIFICANT CHANGE UP
PLATELET # BLD AUTO: 159 K/UL — SIGNIFICANT CHANGE UP (ref 150–400)
PMV BLD: 10.6 FL — SIGNIFICANT CHANGE UP (ref 7–13)
POTASSIUM SERPL-MCNC: 4.4 MMOL/L — SIGNIFICANT CHANGE UP (ref 3.5–5.3)
POTASSIUM SERPL-SCNC: 4.4 MMOL/L — SIGNIFICANT CHANGE UP (ref 3.5–5.3)
PROT SERPL-MCNC: 6.4 G/DL — SIGNIFICANT CHANGE UP (ref 6–8.3)
RBC # BLD: 2.93 M/UL — LOW (ref 3.8–5.2)
RBC # FLD: 19.5 % — HIGH (ref 10.3–14.5)
RBC BLD AUTO: SIGNIFICANT CHANGE UP
RH IG SCN BLD-IMP: POSITIVE — SIGNIFICANT CHANGE UP
S AUREUS DNA NOSE QL NAA+PROBE: SIGNIFICANT CHANGE UP
SODIUM SERPL-SCNC: 139 MMOL/L — SIGNIFICANT CHANGE UP (ref 135–145)
WBC # BLD: 3.35 K/UL — LOW (ref 3.8–10.5)
WBC # FLD AUTO: 3.35 K/UL — LOW (ref 3.8–10.5)

## 2025-07-05 PROCEDURE — 88189 FLOWCYTOMETRY/READ 16 & >: CPT | Mod: 59

## 2025-07-05 PROCEDURE — 99239 HOSP IP/OBS DSCHRG MGMT >30: CPT

## 2025-07-05 RX ORDER — OLAPARIB 150 MG/1
2 TABLET, FILM COATED ORAL
Refills: 0 | DISCHARGE

## 2025-07-05 RX ADMIN — Medication 40 MILLIGRAM(S): at 06:02

## 2025-07-05 RX ADMIN — METOPROLOL SUCCINATE 25 MILLIGRAM(S): 50 TABLET, EXTENDED RELEASE ORAL at 09:29

## 2025-07-05 RX ADMIN — POLYETHYLENE GLYCOL 3350 17 GRAM(S): 17 POWDER, FOR SOLUTION ORAL at 06:03

## 2025-07-05 RX ADMIN — LISINOPRIL 5 MILLIGRAM(S): 5 TABLET ORAL at 09:29

## 2025-07-07 PROBLEM — E78.5 HYPERLIPIDEMIA, UNSPECIFIED: Chronic | Status: ACTIVE | Noted: 2025-07-02

## 2025-07-08 ENCOUNTER — APPOINTMENT (OUTPATIENT)
Dept: PULMONOLOGY | Facility: CLINIC | Age: 55
End: 2025-07-08
Payer: COMMERCIAL

## 2025-07-08 ENCOUNTER — TRANSCRIPTION ENCOUNTER (OUTPATIENT)
Age: 55
End: 2025-07-08

## 2025-07-08 LAB
CULTURE RESULTS: SIGNIFICANT CHANGE UP
NON-GYNECOLOGICAL CYTOLOGY STUDY: SIGNIFICANT CHANGE UP
SPECIMEN SOURCE: SIGNIFICANT CHANGE UP

## 2025-07-08 PROCEDURE — 99496 TRANSJ CARE MGMT HIGH F2F 7D: CPT | Mod: 95

## 2025-07-09 PROBLEM — Z98.890 S/P THORACENTESIS: Status: ACTIVE | Noted: 2025-07-09

## 2025-07-17 PROBLEM — I10 ESSENTIAL (PRIMARY) HYPERTENSION: Chronic | Status: ACTIVE | Noted: 2025-07-02

## 2025-07-18 ENCOUNTER — LABORATORY RESULT (OUTPATIENT)
Age: 55
End: 2025-07-18

## 2025-07-18 ENCOUNTER — APPOINTMENT (OUTPATIENT)
Dept: PULMONOLOGY | Facility: CLINIC | Age: 55
End: 2025-07-18

## 2025-07-18 VITALS
TEMPERATURE: 97.3 F | HEART RATE: 86 BPM | WEIGHT: 230 LBS | HEIGHT: 66 IN | DIASTOLIC BLOOD PRESSURE: 66 MMHG | BODY MASS INDEX: 36.96 KG/M2 | OXYGEN SATURATION: 94 % | SYSTOLIC BLOOD PRESSURE: 115 MMHG

## 2025-07-18 PROCEDURE — 36415 COLL VENOUS BLD VENIPUNCTURE: CPT

## 2025-07-18 PROCEDURE — 99204 OFFICE O/P NEW MOD 45 MIN: CPT

## 2025-07-18 PROCEDURE — 99214 OFFICE O/P EST MOD 30 MIN: CPT

## 2025-07-23 ENCOUNTER — APPOINTMENT (OUTPATIENT)
Dept: CARDIOLOGY | Facility: CLINIC | Age: 55
End: 2025-07-23
Payer: COMMERCIAL

## 2025-07-23 ENCOUNTER — NON-APPOINTMENT (OUTPATIENT)
Age: 55
End: 2025-07-23

## 2025-07-23 ENCOUNTER — TRANSCRIPTION ENCOUNTER (OUTPATIENT)
Age: 55
End: 2025-07-23

## 2025-07-23 VITALS
DIASTOLIC BLOOD PRESSURE: 65 MMHG | WEIGHT: 230.38 LBS | SYSTOLIC BLOOD PRESSURE: 103 MMHG | OXYGEN SATURATION: 95 % | HEART RATE: 97 BPM | BODY MASS INDEX: 37.18 KG/M2 | TEMPERATURE: 97.2 F | RESPIRATION RATE: 16 BRPM

## 2025-07-23 DIAGNOSIS — I42.7 CARDIOMYOPATHY DUE TO DRUG AND EXTERNAL AGENT: ICD-10-CM

## 2025-07-23 DIAGNOSIS — T45.1X5A CARDIOMYOPATHY DUE TO DRUG AND EXTERNAL AGENT: ICD-10-CM

## 2025-07-23 DIAGNOSIS — E78.41 ELEVATED LIPOPROTEIN(A): ICD-10-CM

## 2025-07-23 DIAGNOSIS — Z85.3 PERSONAL HISTORY OF MALIGNANT NEOPLASM OF BREAST: ICD-10-CM

## 2025-07-23 DIAGNOSIS — R53.83 OTHER FATIGUE: ICD-10-CM

## 2025-07-23 PROBLEM — D64.9 ANEMIA: Status: ACTIVE | Noted: 2025-07-09

## 2025-07-23 LAB
ANA TITR SER: NEGATIVE
BASOPHILS # BLD AUTO: 0.05 K/UL
BASOPHILS NFR BLD AUTO: 0.9 %
CCP AB SER IA-ACNC: <8 U/ML
CCP AB SER QL: NEGATIVE
DSDNA AB SER-ACNC: <1 IU/ML
EOSINOPHIL # BLD AUTO: 0 K/UL
EOSINOPHIL NFR BLD AUTO: 0 %
HCT VFR BLD CALC: 26 %
HGB BLD-MCNC: 8 G/DL
LYMPHOCYTES # BLD AUTO: 0.39 K/UL
LYMPHOCYTES NFR BLD AUTO: 7 %
MAN DIFF?: NORMAL
MCHC RBC-ENTMCNC: 29.5 PG
MCHC RBC-ENTMCNC: 30.8 G/DL
MCV RBC AUTO: 95.9 FL
MONOCYTES # BLD AUTO: 0.39 K/UL
MONOCYTES NFR BLD AUTO: 7 %
NEUTROPHILS # BLD AUTO: 3.77 K/UL
NEUTROPHILS NFR BLD AUTO: 67.5 %
NT-PROBNP SERPL-MCNC: 81 PG/ML
PLATELET # BLD AUTO: 243 K/UL
RBC # BLD: 2.71 M/UL
RBC # FLD: 20.3 %
RHEUMATOID FACT SERPL-ACNC: <10 IU/ML
WBC # FLD AUTO: 5.51 K/UL

## 2025-07-23 PROCEDURE — 99214 OFFICE O/P EST MOD 30 MIN: CPT

## 2025-07-23 PROCEDURE — G2211 COMPLEX E/M VISIT ADD ON: CPT | Mod: NC

## 2025-07-23 PROCEDURE — 93010 ELECTROCARDIOGRAM REPORT: CPT

## 2025-07-23 PROCEDURE — 93000 ELECTROCARDIOGRAM COMPLETE: CPT

## 2025-07-24 ENCOUNTER — APPOINTMENT (OUTPATIENT)
Dept: NEUROSURGERY | Facility: CLINIC | Age: 55
End: 2025-07-24
Payer: COMMERCIAL

## 2025-07-24 VITALS
HEIGHT: 66 IN | OXYGEN SATURATION: 100 % | HEART RATE: 78 BPM | DIASTOLIC BLOOD PRESSURE: 63 MMHG | TEMPERATURE: 97.3 F | BODY MASS INDEX: 36.96 KG/M2 | WEIGHT: 230 LBS | SYSTOLIC BLOOD PRESSURE: 97 MMHG

## 2025-07-24 PROCEDURE — 99214 OFFICE O/P EST MOD 30 MIN: CPT

## 2025-07-28 ENCOUNTER — OUTPATIENT (OUTPATIENT)
Dept: OUTPATIENT SERVICES | Facility: HOSPITAL | Age: 55
LOS: 1 days | End: 2025-07-28
Payer: COMMERCIAL

## 2025-07-28 DIAGNOSIS — Z98.890 OTHER SPECIFIED POSTPROCEDURAL STATES: Chronic | ICD-10-CM

## 2025-07-28 DIAGNOSIS — D63.0 ANEMIA IN NEOPLASTIC DISEASE: ICD-10-CM

## 2025-07-28 LAB — BLD GP AB SCN SERPL QL: SIGNIFICANT CHANGE UP

## 2025-07-28 PROCEDURE — 86901 BLOOD TYPING SEROLOGIC RH(D): CPT

## 2025-07-28 PROCEDURE — 86850 RBC ANTIBODY SCREEN: CPT

## 2025-07-28 PROCEDURE — 86923 COMPATIBILITY TEST ELECTRIC: CPT

## 2025-07-28 PROCEDURE — 86900 BLOOD TYPING SEROLOGIC ABO: CPT

## 2025-07-28 PROCEDURE — 36415 COLL VENOUS BLD VENIPUNCTURE: CPT

## 2025-07-31 ENCOUNTER — APPOINTMENT (OUTPATIENT)
Dept: PULMONOLOGY | Facility: CLINIC | Age: 55
End: 2025-07-31
Payer: COMMERCIAL

## 2025-07-31 VITALS
WEIGHT: 226 LBS | OXYGEN SATURATION: 93 % | HEART RATE: 89 BPM | BODY MASS INDEX: 36.32 KG/M2 | HEIGHT: 66 IN | SYSTOLIC BLOOD PRESSURE: 105 MMHG | RESPIRATION RATE: 17 BRPM | DIASTOLIC BLOOD PRESSURE: 66 MMHG

## 2025-07-31 DIAGNOSIS — J90 PLEURAL EFFUSION, NOT ELSEWHERE CLASSIFIED: ICD-10-CM

## 2025-07-31 PROCEDURE — 99205 OFFICE O/P NEW HI 60 MIN: CPT

## 2025-07-31 PROCEDURE — 99215 OFFICE O/P EST HI 40 MIN: CPT

## 2025-07-31 PROCEDURE — 76604 US EXAM CHEST: CPT

## 2025-08-04 ENCOUNTER — APPOINTMENT (OUTPATIENT)
Dept: PULMONOLOGY | Facility: CLINIC | Age: 55
End: 2025-08-04

## 2025-08-05 ENCOUNTER — RESULT REVIEW (OUTPATIENT)
Age: 55
End: 2025-08-05

## 2025-08-05 ENCOUNTER — OUTPATIENT (OUTPATIENT)
Dept: OUTPATIENT SERVICES | Facility: HOSPITAL | Age: 55
LOS: 1 days | End: 2025-08-05
Payer: COMMERCIAL

## 2025-08-05 ENCOUNTER — APPOINTMENT (OUTPATIENT)
Dept: CV DIAGNOSITCS | Facility: HOSPITAL | Age: 55
End: 2025-08-05

## 2025-08-05 DIAGNOSIS — I42.7 CARDIOMYOPATHY DUE TO DRUG AND EXTERNAL AGENT: ICD-10-CM

## 2025-08-05 DIAGNOSIS — Z98.890 OTHER SPECIFIED POSTPROCEDURAL STATES: Chronic | ICD-10-CM

## 2025-08-05 PROCEDURE — 93356 MYOCRD STRAIN IMG SPCKL TRCK: CPT

## 2025-08-05 PROCEDURE — 93306 TTE W/DOPPLER COMPLETE: CPT | Mod: 26

## 2025-08-07 PROBLEM — J90 PLEURAL EFFUSION: Status: ACTIVE | Noted: 2025-07-09

## 2025-08-09 ENCOUNTER — LABORATORY RESULT (OUTPATIENT)
Age: 55
End: 2025-08-09

## 2025-08-09 LAB
ALBUMIN SERPL ELPH-MCNC: 3.8 G/DL
ALP BLD-CCNC: 91 U/L
ALT SERPL-CCNC: 14 U/L
ANION GAP SERPL CALC-SCNC: 16 MMOL/L
APTT BLD: 32.9 SEC
AST SERPL-CCNC: 21 U/L
BASOPHILS # BLD AUTO: 0 K/UL
BASOPHILS NFR BLD AUTO: 0 %
BILIRUB SERPL-MCNC: 0.9 MG/DL
BUN SERPL-MCNC: 11 MG/DL
CALCIUM SERPL-MCNC: 10.2 MG/DL
CHLORIDE SERPL-SCNC: 103 MMOL/L
CO2 SERPL-SCNC: 22 MMOL/L
CREAT SERPL-MCNC: 0.75 MG/DL
EGFRCR SERPLBLD CKD-EPI 2021: 96 ML/MIN/1.73M2
EOSINOPHIL # BLD AUTO: 0 K/UL
EOSINOPHIL NFR BLD AUTO: 0 %
GLUCOSE SERPL-MCNC: 81 MG/DL
HCT VFR BLD CALC: 25.4 %
HGB BLD-MCNC: 7.9 G/DL
INR PPP: 0.97 RATIO
LYMPHOCYTES # BLD AUTO: 0.18 K/UL
LYMPHOCYTES NFR BLD AUTO: 3.6 %
MAN DIFF?: NORMAL
MCHC RBC-ENTMCNC: 31.1 G/DL
MCHC RBC-ENTMCNC: 31.5 PG
MCV RBC AUTO: 101.2 FL
MONOCYTES # BLD AUTO: 0.33 K/UL
MONOCYTES NFR BLD AUTO: 6.4 %
NEUTROPHILS # BLD AUTO: 4.03 K/UL
NEUTROPHILS NFR BLD AUTO: 75.5 %
PLATELET # BLD AUTO: 221 K/UL
POTASSIUM SERPL-SCNC: 4.8 MMOL/L
PROT SERPL-MCNC: 6.8 G/DL
PT BLD: 11.5 SEC
RBC # BLD: 2.51 M/UL
RBC # FLD: 20.7 %
SODIUM SERPL-SCNC: 141 MMOL/L
WBC # FLD AUTO: 5.1 K/UL

## 2025-08-25 ENCOUNTER — OUTPATIENT (OUTPATIENT)
Dept: OUTPATIENT SERVICES | Facility: HOSPITAL | Age: 55
LOS: 1 days | End: 2025-08-25
Payer: COMMERCIAL

## 2025-08-25 DIAGNOSIS — D63.0 ANEMIA IN NEOPLASTIC DISEASE: ICD-10-CM

## 2025-08-25 DIAGNOSIS — Z98.890 OTHER SPECIFIED POSTPROCEDURAL STATES: Chronic | ICD-10-CM

## 2025-08-25 LAB — BLD GP AB SCN SERPL QL: SIGNIFICANT CHANGE UP

## 2025-08-25 PROCEDURE — 86923 COMPATIBILITY TEST ELECTRIC: CPT

## 2025-08-25 PROCEDURE — 36415 COLL VENOUS BLD VENIPUNCTURE: CPT

## 2025-08-25 PROCEDURE — 86900 BLOOD TYPING SEROLOGIC ABO: CPT

## 2025-08-25 PROCEDURE — 86850 RBC ANTIBODY SCREEN: CPT

## 2025-08-25 PROCEDURE — 86901 BLOOD TYPING SEROLOGIC RH(D): CPT

## 2025-08-27 ENCOUNTER — OUTPATIENT (OUTPATIENT)
Dept: OUTPATIENT SERVICES | Facility: HOSPITAL | Age: 55
LOS: 1 days | End: 2025-08-27
Payer: COMMERCIAL

## 2025-08-27 ENCOUNTER — RESULT REVIEW (OUTPATIENT)
Age: 55
End: 2025-08-27

## 2025-08-27 ENCOUNTER — APPOINTMENT (OUTPATIENT)
Dept: PULMONOLOGY | Facility: HOSPITAL | Age: 55
End: 2025-08-27

## 2025-08-27 ENCOUNTER — TRANSCRIPTION ENCOUNTER (OUTPATIENT)
Age: 55
End: 2025-08-27

## 2025-08-27 VITALS
HEART RATE: 79 BPM | OXYGEN SATURATION: 98 % | TEMPERATURE: 97 F | WEIGHT: 220.9 LBS | DIASTOLIC BLOOD PRESSURE: 55 MMHG | HEIGHT: 66 IN | RESPIRATION RATE: 18 BRPM | SYSTOLIC BLOOD PRESSURE: 98 MMHG

## 2025-08-27 VITALS
HEART RATE: 77 BPM | OXYGEN SATURATION: 97 % | DIASTOLIC BLOOD PRESSURE: 54 MMHG | RESPIRATION RATE: 16 BRPM | SYSTOLIC BLOOD PRESSURE: 106 MMHG

## 2025-08-27 DIAGNOSIS — J90 PLEURAL EFFUSION, NOT ELSEWHERE CLASSIFIED: ICD-10-CM

## 2025-08-27 DIAGNOSIS — Z98.890 OTHER SPECIFIED POSTPROCEDURAL STATES: Chronic | ICD-10-CM

## 2025-08-27 LAB
ALBUMIN FLD-MCNC: 2.7 G/DL — SIGNIFICANT CHANGE UP
B PERT IGG+IGM PNL SER: CLEAR — SIGNIFICANT CHANGE UP
CHOLEST FLD-MCNC: 64 MG/DL — SIGNIFICANT CHANGE UP
COLOR FLD: YELLOW
FLUID INTAKE SUBSTANCE CLASS: SIGNIFICANT CHANGE UP
GLUCOSE FLD-MCNC: 85 MG/DL — SIGNIFICANT CHANGE UP
GRAM STN FLD: SIGNIFICANT CHANGE UP
LDH SERPL L TO P-CCNC: 221 U/L — SIGNIFICANT CHANGE UP
LYMPHOCYTES # FLD: 72 % — SIGNIFICANT CHANGE UP
MESOTHL CELL # FLD: SIGNIFICANT CHANGE UP
MONOS+MACROS # FLD: 28 % — SIGNIFICANT CHANGE UP
NEUTROPHILS-BODY FLUID: 0 % — SIGNIFICANT CHANGE UP
NIGHT BLUE STAIN TISS: SIGNIFICANT CHANGE UP
PH FLD: 7.44 — SIGNIFICANT CHANGE UP
PROT FLD-MCNC: 4 G/DL — SIGNIFICANT CHANGE UP
RCV VOL RI: <2000 CELLS/UL — SIGNIFICANT CHANGE UP
SPECIMEN SOURCE FLD: SIGNIFICANT CHANGE UP
SPECIMEN SOURCE: SIGNIFICANT CHANGE UP
SPECIMEN SOURCE: SIGNIFICANT CHANGE UP
TOTAL NUCLEATED CELL COUNT, BODY FLUID: 488 CELLS/UL — SIGNIFICANT CHANGE UP
TUBE TYPE: SIGNIFICANT CHANGE UP
WBC COUNT.: 479 CELLS/UL — SIGNIFICANT CHANGE UP

## 2025-08-27 PROCEDURE — C1729: CPT

## 2025-08-27 PROCEDURE — 87075 CULTR BACTERIA EXCEPT BLOOD: CPT

## 2025-08-27 PROCEDURE — 32555 ASPIRATE PLEURA W/ IMAGING: CPT | Mod: LT

## 2025-08-27 PROCEDURE — 87015 SPECIMEN INFECT AGNT CONCNTJ: CPT

## 2025-08-27 PROCEDURE — 82042 OTHER SOURCE ALBUMIN QUAN EA: CPT

## 2025-08-27 PROCEDURE — 88112 CYTOPATH CELL ENHANCE TECH: CPT | Mod: 26

## 2025-08-27 PROCEDURE — 84157 ASSAY OF PROTEIN OTHER: CPT

## 2025-08-27 PROCEDURE — 83986 ASSAY PH BODY FLUID NOS: CPT

## 2025-08-27 PROCEDURE — 71045 X-RAY EXAM CHEST 1 VIEW: CPT

## 2025-08-27 PROCEDURE — 71045 X-RAY EXAM CHEST 1 VIEW: CPT | Mod: 26

## 2025-08-27 PROCEDURE — 89051 BODY FLUID CELL COUNT: CPT

## 2025-08-27 PROCEDURE — 88305 TISSUE EXAM BY PATHOLOGIST: CPT | Mod: 26

## 2025-08-27 PROCEDURE — 84311 SPECTROPHOTOMETRY: CPT

## 2025-08-27 PROCEDURE — 87070 CULTURE OTHR SPECIMN AEROBIC: CPT

## 2025-08-27 PROCEDURE — 83615 LACTATE (LD) (LDH) ENZYME: CPT

## 2025-08-27 PROCEDURE — 88305 TISSUE EXAM BY PATHOLOGIST: CPT

## 2025-08-27 PROCEDURE — 87205 SMEAR GRAM STAIN: CPT

## 2025-08-27 PROCEDURE — 87102 FUNGUS ISOLATION CULTURE: CPT

## 2025-08-27 PROCEDURE — 87116 MYCOBACTERIA CULTURE: CPT

## 2025-08-27 PROCEDURE — 82945 GLUCOSE OTHER FLUID: CPT

## 2025-08-27 PROCEDURE — 88112 CYTOPATH CELL ENHANCE TECH: CPT

## 2025-08-27 DEVICE — TRAY PNEUMOTHORAX  5/CA: Type: IMPLANTABLE DEVICE | Status: FUNCTIONAL

## 2025-08-27 DEVICE — CATH THERMODILUTION 7FR: Type: IMPLANTABLE DEVICE | Status: FUNCTIONAL

## 2025-08-27 DEVICE — PLEURX CATHETER KIT: Type: IMPLANTABLE DEVICE | Status: FUNCTIONAL

## 2025-08-27 DEVICE — KIT A-LINE 1LUM 20G X 12CM SAFE KIT: Type: IMPLANTABLE DEVICE | Status: FUNCTIONAL

## 2025-08-27 DEVICE — PACK THORACENTESIS CHG: Type: IMPLANTABLE DEVICE | Status: FUNCTIONAL

## 2025-08-27 DEVICE — CATH THERMODIL PACE 7.5FR: Type: IMPLANTABLE DEVICE | Status: FUNCTIONAL

## 2025-08-27 DEVICE — KIT CVC 2LUM MAC 9FR CHG: Type: IMPLANTABLE DEVICE | Status: FUNCTIONAL

## 2025-08-27 RX ORDER — CAPECITABINE 500 MG/1
4 TABLET, FILM COATED ORAL
Refills: 0 | DISCHARGE

## 2025-08-27 RX ORDER — ROSUVASTATIN CALCIUM 20 MG/1
1 TABLET, FILM COATED ORAL
Refills: 0 | DISCHARGE

## 2025-08-27 RX ORDER — TRASTUZUMAB-DKST 150 MG/7.4ML
2 INJECTION, POWDER, LYOPHILIZED, FOR SOLUTION INTRAVENOUS
Refills: 0 | DISCHARGE

## 2025-08-27 RX ORDER — METOCLOPRAMIDE HCL 10 MG
1 TABLET ORAL
Refills: 0 | DISCHARGE

## 2025-08-27 RX ORDER — TUCATINIB 150 MG/1
2 TABLET ORAL
Refills: 0 | DISCHARGE

## 2025-08-27 RX ORDER — LISINOPRIL 5 MG/1
1 TABLET ORAL
Refills: 0 | DISCHARGE

## 2025-08-28 ENCOUNTER — NON-APPOINTMENT (OUTPATIENT)
Age: 55
End: 2025-08-28

## 2025-09-01 LAB
CULTURE RESULTS: NO GROWTH — SIGNIFICANT CHANGE UP
SPECIMEN SOURCE: SIGNIFICANT CHANGE UP

## 2025-09-02 LAB — NON-GYNECOLOGICAL CYTOLOGY STUDY: SIGNIFICANT CHANGE UP

## 2025-09-03 ENCOUNTER — APPOINTMENT (OUTPATIENT)
Dept: PULMONOLOGY | Facility: CLINIC | Age: 55
End: 2025-09-03

## 2025-09-03 VITALS
DIASTOLIC BLOOD PRESSURE: 70 MMHG | WEIGHT: 218 LBS | HEIGHT: 66 IN | TEMPERATURE: 97.8 F | HEART RATE: 77 BPM | SYSTOLIC BLOOD PRESSURE: 106 MMHG | OXYGEN SATURATION: 93 % | BODY MASS INDEX: 35.03 KG/M2

## 2025-09-03 DIAGNOSIS — C50.912 MALIGNANT NEOPLASM OF UNSPECIFIED SITE OF LEFT FEMALE BREAST: ICD-10-CM

## 2025-09-03 DIAGNOSIS — R53.83 OTHER FATIGUE: ICD-10-CM

## 2025-09-03 DIAGNOSIS — C79.51 MALIGNANT NEOPLASM OF UNSPECIFIED SITE OF LEFT FEMALE BREAST: ICD-10-CM

## 2025-09-03 DIAGNOSIS — J90 PLEURAL EFFUSION, NOT ELSEWHERE CLASSIFIED: ICD-10-CM

## 2025-09-03 PROCEDURE — 99214 OFFICE O/P EST MOD 30 MIN: CPT

## 2025-09-03 PROCEDURE — 76604 US EXAM CHEST: CPT

## 2025-09-03 PROCEDURE — G2211 COMPLEX E/M VISIT ADD ON: CPT | Mod: NC

## 2025-09-04 PROBLEM — Z86.79 PERSONAL HISTORY OF OTHER DISEASES OF THE CIRCULATORY SYSTEM: Chronic | Status: ACTIVE | Noted: 2025-08-27

## 2025-09-11 ENCOUNTER — APPOINTMENT (OUTPATIENT)
Dept: PULMONOLOGY | Facility: CLINIC | Age: 55
End: 2025-09-11

## 2025-09-11 VITALS
HEART RATE: 70 BPM | HEIGHT: 66 IN | OXYGEN SATURATION: 97 % | RESPIRATION RATE: 18 BRPM | DIASTOLIC BLOOD PRESSURE: 60 MMHG | BODY MASS INDEX: 35.36 KG/M2 | SYSTOLIC BLOOD PRESSURE: 110 MMHG | WEIGHT: 220 LBS

## 2025-09-11 PROCEDURE — 99214 OFFICE O/P EST MOD 30 MIN: CPT

## 2025-09-11 PROCEDURE — 76604 US EXAM CHEST: CPT

## (undated) DEVICE — SOL IRR POUR H2O 250ML

## (undated) DEVICE — DRAPE EQUIPMENT BANDED BAG 30 X 30" (SHOWER CAP)

## (undated) DEVICE — ELCTR GROUNDING PAD ADULT COVIDIEN

## (undated) DEVICE — SOL INJ NS 0.9% 500ML 1-PORT

## (undated) DEVICE — DRSG XEROFORM 1 X 8"

## (undated) DEVICE — NDL ASPIRATION 21G

## (undated) DEVICE — MASK O2 NON REBREATH 3IN1 ADULT

## (undated) DEVICE — MIDAS REX MR8 MATCH HEAD FLUTED LG BORE 3MM X 14CM

## (undated) DEVICE — GLV 8 PROTEXIS (WHITE)

## (undated) DEVICE — PACK LUMBAR LAMI

## (undated) DEVICE — VALVE SUCTION EVIS 160/200/240

## (undated) DEVICE — ELCTR BOVIE PENCIL HANDPIECE

## (undated) DEVICE — DRAPE STERILE-Z PATIENT

## (undated) DEVICE — POSITIONER FOAM EGG CRATE ULNAR 2PCS (PINK)

## (undated) DEVICE — DRAPE C ARM C-ARMOUR

## (undated) DEVICE — PREP CHLORAPREP HI-LITE ORANGE 26ML

## (undated) DEVICE — TUBING SUCTION CONN 6FT STERILE

## (undated) DEVICE — NDL HYPO SAFE 18G X 1.5" (PINK)

## (undated) DEVICE — MARKING PEN W RULER

## (undated) DEVICE — DRSG TELFA 3 X 8

## (undated) DEVICE — ELCTR BOVIE TIP BLADE INSULATED 6.5" EDGE

## (undated) DEVICE — SYR LUER LOK 3CC

## (undated) DEVICE — TRAP SPECIMEN SPUTUM 40CC

## (undated) DEVICE — NDL SPINAL 18G X 3.5" (PINK)

## (undated) DEVICE — GOWN TRIMAX LG

## (undated) DEVICE — DRAPE TOWEL BLUE 17" X 24"

## (undated) DEVICE — STAPLER SKIN VISI-STAT 35 WIDE

## (undated) DEVICE — MEDICATION LABELS W MARKER

## (undated) DEVICE — SUT BIOSYN 4-0 18" P-12

## (undated) DEVICE — DRAPE LIGHT HANDLE COVER (GREEN)

## (undated) DEVICE — FOLEY TRAY 16FR LF URINE METER SURESTEP

## (undated) DEVICE — DRSG CURITY GAUZE SPONGE 4 X 4" 12-PLY

## (undated) DEVICE — SOL INJ NS 0.9% 250ML

## (undated) DEVICE — Device

## (undated) DEVICE — BRUSH CYTO ENDO

## (undated) DEVICE — VENODYNE/SCD SLEEVE CALF MEDIUM

## (undated) DEVICE — SUCTION YANKAUER NO CONTROL VENT

## (undated) DEVICE — TUBING SUCTION 20FT

## (undated) DEVICE — DRAPE C-ARM 41X84"

## (undated) DEVICE — TRACKING BALL STRL

## (undated) DEVICE — LAP PAD 18 X 18"

## (undated) DEVICE — SPECIMEN CONTAINER 100ML

## (undated) DEVICE — SYR LUER LOK 50CC

## (undated) DEVICE — DRAPE 1/2 SHEET 40X57"

## (undated) DEVICE — FILTERLINE ET TUBE PED/ADLT ETCO2

## (undated) DEVICE — ELCTR BOVIE TIP BLADE INSULATED 2.75" EDGE

## (undated) DEVICE — BALLOON SINGLE FOR BF-UC160F

## (undated) DEVICE — WARMING BLANKET UPPER ADULT

## (undated) DEVICE — SYR LUER LOK 20CC

## (undated) DEVICE — FILTERLINE NASAL O2 CO2 ADLT

## (undated) DEVICE — FORCEP RADIAL JAW 4 PEDIATRIC W NDL 1.8MM 2MM 160CM DISP

## (undated) DEVICE — DRAPE GENERAL ENDOSCOPY

## (undated) DEVICE — CHEST DRAIN PLEUR-EVAC WET/WET ADULT-PEDS SINGLE (QUICK)

## (undated) DEVICE — MIDAS REX MR7 LUBRICANT DIFFUSER CARTRIDGE

## (undated) DEVICE — VALVE BIOPSY BRONCHOVIDEOSCOPE

## (undated) DEVICE — WARMING BLANKET LOWER ADULT

## (undated) DEVICE — ELCTR AQUAMANTYS BIPOLAR SEALER 6.0

## (undated) DEVICE — NDL ASPIRATION 22G W SYR

## (undated) DEVICE — VISITEC 4X4

## (undated) DEVICE — MISONIX BONESCALPEL DIAMOND SHAVER & TUBESET

## (undated) DEVICE — FOLEY HOLDER STATLOCK 2 WAY ADULT

## (undated) DEVICE — SUCTION CATH ARGYLE WHISTLE TIP 14FR STRAIGHT PACKED

## (undated) DEVICE — SOL IRR POUR NS 0.9% 500ML

## (undated) DEVICE — SUT POLYSORB 0 36" GS-25

## (undated) DEVICE — DRAPE 3/4 SHEET W REINFORCEMENT 56X77"

## (undated) DEVICE — MISONIX BONESCALPEL BLUNT BLADE & TUBESET 20MM

## (undated) DEVICE — SUT POLYSORB 2-0 27" GS-21

## (undated) DEVICE — SYR LUER LOK 10CC